# Patient Record
Sex: FEMALE | Race: WHITE | NOT HISPANIC OR LATINO | Employment: FULL TIME | URBAN - METROPOLITAN AREA
[De-identification: names, ages, dates, MRNs, and addresses within clinical notes are randomized per-mention and may not be internally consistent; named-entity substitution may affect disease eponyms.]

---

## 2017-03-07 ENCOUNTER — LAB VISIT (OUTPATIENT)
Dept: LAB | Facility: HOSPITAL | Age: 52
End: 2017-03-07
Attending: FAMILY MEDICINE
Payer: COMMERCIAL

## 2017-03-07 DIAGNOSIS — E08.8 DIABETES MELLITUS DUE TO UNDERLYING CONDITION WITH COMPLICATION, WITHOUT LONG-TERM CURRENT USE OF INSULIN: ICD-10-CM

## 2017-03-07 PROCEDURE — 83036 HEMOGLOBIN GLYCOSYLATED A1C: CPT

## 2017-03-07 PROCEDURE — 36415 COLL VENOUS BLD VENIPUNCTURE: CPT | Mod: PO

## 2017-03-08 LAB
ESTIMATED AVG GLUCOSE: 143 MG/DL
HBA1C MFR BLD HPLC: 6.6 %

## 2017-03-14 ENCOUNTER — OFFICE VISIT (OUTPATIENT)
Dept: FAMILY MEDICINE | Facility: CLINIC | Age: 52
End: 2017-03-14
Payer: COMMERCIAL

## 2017-03-14 VITALS
SYSTOLIC BLOOD PRESSURE: 114 MMHG | HEIGHT: 62 IN | BODY MASS INDEX: 40.33 KG/M2 | WEIGHT: 219.13 LBS | HEART RATE: 96 BPM | DIASTOLIC BLOOD PRESSURE: 70 MMHG | TEMPERATURE: 98 F

## 2017-03-14 DIAGNOSIS — R93.89 ABNORMAL CT SCAN, CHEST: ICD-10-CM

## 2017-03-14 DIAGNOSIS — E78.5 HYPERLIPIDEMIA, UNSPECIFIED HYPERLIPIDEMIA TYPE: ICD-10-CM

## 2017-03-14 DIAGNOSIS — E11.65 UNCONTROLLED TYPE 2 DIABETES MELLITUS WITH HYPERGLYCEMIA, WITHOUT LONG-TERM CURRENT USE OF INSULIN: ICD-10-CM

## 2017-03-14 DIAGNOSIS — I10 HTN (HYPERTENSION), BENIGN: Primary | ICD-10-CM

## 2017-03-14 LAB
CREAT UR-MCNC: 88 MG/DL
MICROALBUMIN UR DL<=1MG/L-MCNC: 11 UG/ML
MICROALBUMIN/CREATININE RATIO: 12.5 UG/MG

## 2017-03-14 PROCEDURE — 3078F DIAST BP <80 MM HG: CPT | Mod: S$GLB,,, | Performed by: FAMILY MEDICINE

## 2017-03-14 PROCEDURE — 1160F RVW MEDS BY RX/DR IN RCRD: CPT | Mod: S$GLB,,, | Performed by: FAMILY MEDICINE

## 2017-03-14 PROCEDURE — 3074F SYST BP LT 130 MM HG: CPT | Mod: S$GLB,,, | Performed by: FAMILY MEDICINE

## 2017-03-14 PROCEDURE — 99214 OFFICE O/P EST MOD 30 MIN: CPT | Mod: S$GLB,,, | Performed by: FAMILY MEDICINE

## 2017-03-14 PROCEDURE — 82570 ASSAY OF URINE CREATININE: CPT

## 2017-03-14 PROCEDURE — 3044F HG A1C LEVEL LT 7.0%: CPT | Mod: S$GLB,,, | Performed by: FAMILY MEDICINE

## 2017-03-14 PROCEDURE — 99999 PR PBB SHADOW E&M-EST. PATIENT-LVL IV: CPT | Mod: PBBFAC,,, | Performed by: FAMILY MEDICINE

## 2017-03-14 PROCEDURE — 4010F ACE/ARB THERAPY RXD/TAKEN: CPT | Mod: S$GLB,,, | Performed by: FAMILY MEDICINE

## 2017-03-14 PROCEDURE — 2022F DILAT RTA XM EVC RTNOPTHY: CPT | Mod: S$GLB,,, | Performed by: FAMILY MEDICINE

## 2017-03-14 RX ORDER — METFORMIN HYDROCHLORIDE 500 MG/1
1000 TABLET ORAL 2 TIMES DAILY WITH MEALS
Qty: 360 TABLET | Refills: 3 | Status: SHIPPED | OUTPATIENT
Start: 2017-03-14 | End: 2018-02-24 | Stop reason: SDUPTHER

## 2017-03-14 NOTE — MR AVS SNAPSHOT
[facility]  [address] [facility], [address]  [facility] [address]  Phone: [phone]  Fax: [phone]                  Yamileth Wong   3/14/2017 3:00 PM   Office Visit    Description:  Female : 1965   Provider:  Nicole Dickerson MD   Department:  [facility]           Reason for Visit     Diabetes     Arm Pain           Diagnoses this Visit        Comments    HTN (hypertension), benign    -  Primary     Uncontrolled type 2 diabetes mellitus with hyperglycemia, without long-term current use of insulin         Hyperlipidemia, unspecified hyperlipidemia type         Abnormal CT scan, chest                To Do List           Future Appointments        Provider Department Dept Phone    3/23/2017 10:00 AM Anand Garrison MD [facility] [phone]      Goals (5 Years of Data)     None      Follow-Up and Disposition     Return in about 3 months (around 2017) for hga1c only.       These Medications        Disp Refills Start End    metformin (GLUCOPHAGE) 500 MG tablet 360 tablet 3 3/14/2017 3/14/2018    Take 2 tablets (1,000 mg total) by mouth 2 (two) times daily with meals. - Oral    Pharmacy: [facility]/pharmacy #[id number] - [place] - [address] Ph #: [phone]         [facility] On Call     [facility] On Call Nurse Care Line -  Assistance  Registered nurses in the [facility] On Call Center provide clinical advisement, health education, appointment booking, and other advisory services.  Call for this free service at [phone].             Medications           Message regarding Medications     Verify the changes and/or additions to your medication regime listed below are the same as discussed with your clinician today.  If any of these changes or additions are incorrect, please notify your healthcare provider.        START taking these NEW medications        Refills    metformin (GLUCOPHAGE) 500 MG tablet 3    Sig: Take 2 tablets (1,000 mg  "total) by mouth 2 (two) times daily with meals.    Class: Normal    Route: Oral      STOP taking these medications     doxycycline (VIBRA-TABS) 100 MG tablet Take 1 tablet (100 mg total) by mouth every 12 (twelve) hours.           Verify that the below list of medications is an accurate representation of the medications you are currently taking.  If none reported, the list may be blank. If incorrect, please contact your healthcare provider. Carry this list with you in case of emergency.           Current Medications     antipyrine-benzocaine (AURALGAN OR EQUIV) 5.4-1.4 % Drop Place 3 drops into both ears 2 (two) times daily as needed.     atorvastatin (LIPITOR) 10 MG tablet Take 1 tablet (10 mg total) by mouth once daily.    blood sugar diagnostic (ACCU-CHEK FAITH PLUS TEST STRP) Strp TEST BLOOD SUGAR TWICE DAILY    blood-glucose meter kit Use as instructed    escitalopram oxalate (LEXAPRO) 10 MG tablet Take 1 tablet (10 mg total) by mouth once daily.    glipiZIDE (GLUCOTROL) 5 MG TR24 Take 1 tablet (5 mg total) by mouth daily with breakfast.    lancets (ACCU-CHEK SOFTCLIX LANCETS) Misc TEST BLOOD SUGAR TWICE DAILY    lisinopril-hydrochlorothiazide (PRINZIDE,ZESTORETIC) 10-12.5 mg per tablet Take 1 tablet by mouth once daily.    budesonide (RINOCORT AQUA) 32 mcg/actuation nasal spray 1 spray (32 mcg total) by Nasal route once daily.    fexofenadine (ALLEGRA) 180 MG tablet Take 1 tablet (180 mg total) by mouth once daily.    metformin (GLUCOPHAGE) 500 MG tablet Take 2 tablets (1,000 mg total) by mouth 2 (two) times daily with meals.           Clinical Reference Information           Your Vitals Were     BP Pulse Temp Height Weight BMI    114/70 (BP Location: Left arm) 96 98.3 °F (36.8 °C) 5' 2" (1.575 m) 99.4 kg (219 lb 2.2 oz) 40.08 kg/m2      Blood Pressure          Most Recent Value    BP  114/70      Allergies as of 3/14/2017     Latex    Penicillins      Immunizations Administered on Date of Encounter - " 3/14/2017     None      Orders Placed During Today's Visit      Normal Orders This Visit    Microalbumin/creatinine urine ratio     Future Labs/Procedures Expected by Expires    Hemoglobin A1c  6/14/2017 5/13/2018      Instructions    Get results of CT chest from [facility], may have upcoming thoracic surgery to remove & diagnosis  ================    FOLLOW UP REMINDER for DIABETICS:  ===================================  If you have DIABETES, we should evaluate your blood test every 3 MONTHS if your Hg1c is >6.5% OR if you use INSULIN.     ===================================  Continue other medications    The future risks of uncontrolled diabetes - include heart attack, stroke, neuropathy (pain in hands & feet that could lead to infection and amputation), nephropathy (leading to kidney dialysis) , and blindness     To prevent complications that can be treated early, please see your  opthalmologist EYE DOCTOR YEARLY      Always wear protective shoes.     Focus on the American Diabetic Association diet, high fiber, low fat diet, exercise  - huffing & puffing for 20 minutes most days of the week    Focus on NO SUGAR and no sugar substitutes (splenda, etc) IN YOUR DRINKS. With respect to food - LOW CARBOHYDRATES - switch to whole wheat & brown rice.    Decrease & try to stop ALCOHOL, WHITE BREAD, WHITE PASTA, WHITE RICE , WHITE POTATOES- which all turn into sugar.    EAT an EXTRA VEGETABLE A DAY than you already do.    The ADA recommends taking  1. Aspirin 81 mg daily (unless you have had bleeding stomach ulcers or allergy to this)  2. STATIN medication (atorvastatin, pravastatin, rosuvastatin) to decrease inflammation in your blood vessels caused by SUGAR to prevent future heart attack & stroke. This is recommended even if your LDL cholesterol is <100.   3. ACE/ ARB blood pressure medication (Losartan, Lisinopril) to protect your kidneys from future dialysis from SUGAR. This is recommended even if you have normal blood  pressure    Once YEARLY I will check basic labs CBC, CMP, fasting lipids, TSH, Hga1C prior to your visit      ----------------------------             Language Assistance Services     ATTENTION: Language assistance services are available, free of charge. Please call [phone].      ATENCIÓN: Si habla español, tiene a romo disposición servicios gratuitos de asistencia lingüística. Llame al [phone].     JENNIFER Ý: N?u b?n nói Ti?ng Vi?t, có các d?ch v? h? tr? ngôn ng? mi?n phí dành cho b?n. G?i s? [phone].         [facility] complies with applicable Federal civil rights laws and does not discriminate on the basis of race, color, national origin, age, disability, or sex.

## 2017-03-14 NOTE — PATIENT INSTRUCTIONS
Get results of CT chest from [facility], may have upcoming thoracic surgery to remove & diagnosis  ================    FOLLOW UP REMINDER for DIABETICS:  ===================================  If you have DIABETES, we should evaluate your blood test every 3 MONTHS if your Hg1c is >6.5% OR if you use INSULIN.     ===================================  Continue other medications    The future risks of uncontrolled diabetes - include heart attack, stroke, neuropathy (pain in hands & feet that could lead to infection and amputation), nephropathy (leading to kidney dialysis) , and blindness     To prevent complications that can be treated early, please see your  opthalmologist EYE DOCTOR YEARLY      Always wear protective shoes.     Focus on the American Diabetic Association diet, high fiber, low fat diet, exercise  - huffing & puffing for 20 minutes most days of the week    Focus on NO SUGAR and no sugar substitutes (splenda, etc) IN YOUR DRINKS. With respect to food - LOW CARBOHYDRATES - switch to whole wheat & brown rice.    Decrease & try to stop ALCOHOL, WHITE BREAD, WHITE PASTA, WHITE RICE , WHITE POTATOES- which all turn into sugar.    EAT an EXTRA VEGETABLE A DAY than you already do.    The ADA recommends taking  1. Aspirin 81 mg daily (unless you have had bleeding stomach ulcers or allergy to this)  2. STATIN medication (atorvastatin, pravastatin, rosuvastatin) to decrease inflammation in your blood vessels caused by SUGAR to prevent future heart attack & stroke. This is recommended even if your LDL cholesterol is <100.   3. ACE/ ARB blood pressure medication (Losartan, Lisinopril) to protect your kidneys from future dialysis from SUGAR. This is recommended even if you have normal blood pressure    Once YEARLY I will check basic labs CBC, CMP, fasting lipids, TSH, Hga1C prior to your visit      ----------------------------

## 2017-03-14 NOTE — MEDICAL/APP STUDENT
Subjective:       Patient ID: Yamileth Wong is a 51 y.o. female.    Chief Complaint: Diabetes and Arm Pain (left elbow)    HPI   Patient is a 51 year old female with a Hx of DM II, HTN, HLD. She came in for a follow-up of her Diabetes. She doesn't regularly record her sugar readings at home. However, she recollects sugar readings in the 150's. Patient admits she neither watches her diet nor exercises regularly.   She denies any blurry vision, numbness or tingling. No peripheral neuropathy. No polyuria, polydipsia or nocturia    Review of Systems    Constitutional: No fever, chills, night sweats, fatigue or weakness.  Respiratory: No coughs, no cold or wheezes  Cardiovascular: No SOB upon exertion, no palpitations, no chest pains.   GI: No diarrhea, no constipation.  : No increased urinary frequency or nocturia  Endocrine: No polyuria, polydipsia or nocturia.   Musculoskeletal: No joint pain, no muscle pain.  Psych: No anxiety    Objective:      Physical Exam    General: Patient is oriented to person place and time.  HEENT: No conjunctival pallor. No icterus. No cervical lymphadenopathy, no carotid bruits. Oropharynx moist and clear of exudate.  Respiratory: Bilateral air movement. No wheezes, no crackles.  Cardiovascular: Normal heart sounds. No murmurs, gallops or rubs.  GI: No tenderness,no guarding, no lumps. Normal bowel sounds.  Musculoskeletal: Normal ROM.  Skin: No ulcers or sores under both feet.  Neuro: No loss of sensation in the periphery    Assessment:       1. Uncontrolled type 2 diabetes mellitus with hyperglycemia, without long-term current use of insulin        Plan:       Patient is a 51 year old female here for a follow-up of her diabetes.   We discussed lifestyle modification to help control her sugar levels, but she seemed disinterested in pursuing any healthy lifestyle options.  We increased her metformin to a 100mg BID.   Patient is to follow up with labs in 3 weeks.

## 2017-03-14 NOTE — PROGRESS NOTES
Subjective:      Patient ID: Yamileth Wong is a 51 y.o. female.    Chief Complaint: Diabetes and Arm Pain (left elbow)    Here today for diabetes mellitus  6.6%    Denies acute symptoms such as nocturia, polyuria, unexplained weight loss or blurred vision.    Last eye evaluation due    Last urine microalbumin today    Denies numbness, tingling sensation, or known h/o peripheral neuropathy.     Denies  Chest pain, shortness of breath.    She has been eating more sugar as she is concerned about recent diagnosis of abnormal CAT scan of the chest.  As her ENT was evaluating for neck lymphadenopathy & hx thyroid removal, they found a 3 cm mass supple brittany.  With its location they are unable to do a needle biopsy so she will have to have thoracic surgery to remove for diagnosis.  This will be performed at [facility].  Unfortunately do not have the CAT scan results.denies  Weight loss, fever, chills, night sweats      Lab Results   Component Value Date    HGBA1C 6.6 (H) 03/07/2017    HGBA1C 6.5 (H) 11/10/2016    HGBA1C 7.4 (H) 08/02/2016     No results found for: MICROALBUR  Lab Results   Component Value Date    LDLCALC 74.0 11/10/2016    LDLCALC 92.6 09/17/2015    CHOL 147 11/10/2016    HDL 59 11/10/2016    TRIG 70 11/10/2016       Lab Results   Component Value Date     11/10/2016    K 5.1 11/10/2016     11/10/2016    CO2 25 11/10/2016     (H) 11/10/2016    BUN 15 11/10/2016    CREATININE 0.7 11/10/2016    CALCIUM 9.4 11/10/2016    PROT 7.1 11/10/2016    ALBUMIN 3.7 11/10/2016    BILITOT 0.4 11/10/2016    ALKPHOS 50 (L) 11/10/2016    AST 14 11/10/2016    ALT 18 11/10/2016    ANIONGAP 9 11/10/2016    ESTGFRAFRICA >60.0 11/10/2016    EGFRNONAA >60.0 11/10/2016    WBC 7.77 11/10/2016    HGB 12.5 11/10/2016    HCT 39.6 11/10/2016    MCV 88 11/10/2016     11/10/2016    TSH 1.548 11/10/2016         Current Outpatient Prescriptions on File Prior to Visit   Medication Sig     antipyrine-benzocaine (AURALGAN OR EQUIV) 5.4-1.4 % Drop Place 3 drops into both ears 2 (two) times daily as needed.     atorvastatin (LIPITOR) 10 MG tablet Take 1 tablet (10 mg total) by mouth once daily.    blood sugar diagnostic (ACCU-CHEK FAITH PLUS TEST STRP) Strp TEST BLOOD SUGAR TWICE DAILY    blood-glucose meter kit Use as instructed    escitalopram oxalate (LEXAPRO) 10 MG tablet Take 1 tablet (10 mg total) by mouth once daily.    glipiZIDE (GLUCOTROL) 5 MG TR24 Take 1 tablet (5 mg total) by mouth daily with breakfast.    lancets (ACCU-CHEK SOFTCLIX LANCETS) Misc TEST BLOOD SUGAR TWICE DAILY    lisinopril-hydrochlorothiazide (PRINZIDE,ZESTORETIC) 10-12.5 mg per tablet Take 1 tablet by mouth once daily.    [ metformin (GLUCOPHAGE) 850 MG tablet TAKE 1 TABLET (850 MG TOTAL) BY MOUTH 2 (TWO) TIMES DAILY WITH MEALS.    budesonide (RINOCORT AQUA) 32 mcg/actuation nasal spray 1 spray (32 mcg total) by Nasal route once daily.    fexofenadine (ALLEGRA) 180 MG tablet Take 1 tablet (180 mg total) by mouth once daily.     Past Medical History:   Diagnosis Date    Abnormal CT scan, chest 3/14/2017    At [facility], subcarinal 3 cm, upcoming surgery    Anxiety 10/9/2013    Lexapro works well    Colon polyp     2016, repeat before 2021    HLD (hyperlipidemia) 10/9/2013    LDL 62 Lip 10    HTN (hypertension), benign 10/9/2013    Type 2 diabetes mellitus, uncontrolled 10/9/2013    U+ ACE 2015, F-2015 Hga1c 6.6% metformin 850 bid. LDL 62 Lip 10    Uncontrolled type 2 diabetes mellitus with hyperglycemia, without long-term current use of insulin 3/14/2017     Past Surgical History:   Procedure Laterality Date    thyroid nodule  2009    DR Haynes     Social History     Social History Narrative    Works with insurance, No children, nonsmoker, ETOH rarely, GYN Bone, colon polyp 2016, repeat before 2021     No family history on file.  Vitals:    03/14/17 1456   BP: 114/70   Pulse: 96   Temp: 98.3 °F (36.8 °C)  "  Weight: 99.4 kg (219 lb 2.2 oz)   Height: 5' 2" (1.575 m)   PainSc:   5     Objective:   Physical Exam   Constitutional: She is oriented to person, place, and time. She appears well-developed and well-nourished.   HENT:   Head: Normocephalic and atraumatic.   Right Ear: External ear normal.   Left Ear: External ear normal.   Nose: Nose normal.   Mouth/Throat: Oropharynx is clear and moist.   Eyes: EOM are normal. Pupils are equal, round, and reactive to light.   Neck: Neck supple. No thyromegaly present.   Cardiovascular: Normal rate, regular rhythm, normal heart sounds and intact distal pulses.    No murmur heard.  Pulmonary/Chest: Effort normal and breath sounds normal. She has no wheezes.   Abdominal: Soft. Bowel sounds are normal. She exhibits no distension and no mass. There is no tenderness. There is no rebound and no guarding.   Musculoskeletal: She exhibits no edema.        Right foot: There is normal range of motion and no deformity.        Left foot: There is normal range of motion and no deformity.        Feet:   Right Foot:   Protective Sensation: 5 sites tested. 5 sites sensed.   Skin Integrity: Negative for ulcer, blister, skin breakdown, erythema or warmth.   Left Foot:   Protective Sensation: 5 sites tested. 5 sites sensed.   Skin Integrity: Negative for ulcer, blister, skin breakdown, erythema or warmth.   Lymphadenopathy:     She has no cervical adenopathy.   Neurological: She is alert and oriented to person, place, and time.   Skin: Skin is warm and dry.   Psychiatric: She has a normal mood and affect. Her behavior is normal.     Assessment:     1. HTN (hypertension), benign    2. Uncontrolled type 2 diabetes mellitus with hyperglycemia, without long-term current use of insulin    3. Hyperlipidemia, unspecified hyperlipidemia type    4. Abnormal CT scan, chest      Plan:     Orders Placed This Encounter    Hemoglobin A1c    Microalbumin/creatinine urine ratio    metformin (GLUCOPHAGE) 500 MG " tablet   2 po bid    BP stable, continue meds    Patient Instructions   Get results of CT chest from [facility], may have upcoming thoracic surgery to remove & diagnosis  ================    FOLLOW UP REMINDER for DIABETICS:  ===================================  If you have DIABETES, we should evaluate your blood test every 3 MONTHS if your Hg1c is >6.5% OR if you use INSULIN.     ===================================  Continue other medications    The future risks of uncontrolled diabetes - include heart attack, stroke, neuropathy (pain in hands & feet that could lead to infection and amputation), nephropathy (leading to kidney dialysis) , and blindness     To prevent complications that can be treated early, please see your  opthalmologist EYE DOCTOR YEARLY      Always wear protective shoes.     Focus on the American Diabetic Association diet, high fiber, low fat diet, exercise  - huffing & puffing for 20 minutes most days of the week    Focus on NO SUGAR and no sugar substitutes (splenda, etc) IN YOUR DRINKS. With respect to food - LOW CARBOHYDRATES - switch to whole wheat & brown rice.    Decrease & try to stop ALCOHOL, WHITE BREAD, WHITE PASTA, WHITE RICE , WHITE POTATOES- which all turn into sugar.    EAT an EXTRA VEGETABLE A DAY than you already do.    The ADA recommends taking  1. Aspirin 81 mg daily (unless you have had bleeding stomach ulcers or allergy to this)  2. STATIN medication (atorvastatin, pravastatin, rosuvastatin) to decrease inflammation in your blood vessels caused by SUGAR to prevent future heart attack & stroke. This is recommended even if your LDL cholesterol is <100.   3. ACE/ ARB blood pressure medication (Losartan, Lisinopril) to protect your kidneys from future dialysis from SUGAR. This is recommended even if you have normal blood pressure    Once YEARLY I will check basic labs CBC, CMP, fasting lipids, TSH, Hga1C prior to your  visit      ----------------------------

## 2017-03-23 ENCOUNTER — OFFICE VISIT (OUTPATIENT)
Dept: OBSTETRICS AND GYNECOLOGY | Facility: CLINIC | Age: 52
End: 2017-03-23
Payer: COMMERCIAL

## 2017-03-23 VITALS
SYSTOLIC BLOOD PRESSURE: 138 MMHG | HEIGHT: 62 IN | DIASTOLIC BLOOD PRESSURE: 80 MMHG | WEIGHT: 216.06 LBS | BODY MASS INDEX: 39.76 KG/M2

## 2017-03-23 DIAGNOSIS — Z01.419 ENCOUNTER FOR GYNECOLOGICAL EXAMINATION: Primary | ICD-10-CM

## 2017-03-23 DIAGNOSIS — J06.9 UPPER RESPIRATORY TRACT INFECTION, UNSPECIFIED TYPE: ICD-10-CM

## 2017-03-23 PROCEDURE — 99396 PREV VISIT EST AGE 40-64: CPT | Mod: S$GLB,,, | Performed by: OBSTETRICS & GYNECOLOGY

## 2017-03-23 PROCEDURE — 99999 PR PBB SHADOW E&M-EST. PATIENT-LVL III: CPT | Mod: PBBFAC,,, | Performed by: OBSTETRICS & GYNECOLOGY

## 2017-03-23 PROCEDURE — 3079F DIAST BP 80-89 MM HG: CPT | Mod: S$GLB,,, | Performed by: OBSTETRICS & GYNECOLOGY

## 2017-03-23 PROCEDURE — 3075F SYST BP GE 130 - 139MM HG: CPT | Mod: S$GLB,,, | Performed by: OBSTETRICS & GYNECOLOGY

## 2017-03-23 RX ORDER — AZITHROMYCIN 250 MG/1
500 TABLET, FILM COATED ORAL DAILY
Qty: 6 TABLET | Refills: 0 | Status: SHIPPED | OUTPATIENT
Start: 2017-03-23 | End: 2017-03-28

## 2017-03-23 NOTE — PROGRESS NOTES
CC: Well woman exam    Yamileth Wong is a 51 y.o. female  presents for a well woman exam.  She is established.LMP: No LMP recorded. Patient has had a hysterectomy..  Annual Exam, last mammo - normal DIS. Seeing a thoracic surgeon  for Neck lymph node. 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  Has seen Dr. Azevedo and Dr. Segal.  Is scheduled to go see Dr. Sears next month.  Patient without GYN complaints.    Last mammogram 2017 normal diagnostic imaging  Last colonoscopy  polyp benign repeat in 5 years Dr Morales    Health Maintenance   Topic Date Due    TETANUS VACCINE  1981    Pneumococcal PPSV23 (Medium Risk) (1) 1981    Colonoscopy  2013    Influenza Vaccine  2016    Hemoglobin A1c  2017    Eye Exam  2017    Lipid Panel  11/10/2017    Foot Exam  2018    Mammogram  2019    Pap Smear  2019    Hepatitis C Screening  Completed       Past Medical History:   Diagnosis Date    Abnormal CT scan, chest 2017    At [facility], subcarinal 3 cm, upcoming surgery    Abnormal Pap smear of cervix     Anxiety 10/9/2013    Lexapro works well    Colon polyp     , repeat before     HLD (hyperlipidemia) 10/9/2013    LDL 62 Lip 10    HTN (hypertension), benign 10/9/2013    Menopause     Type 2 diabetes mellitus, uncontrolled 10/9/2013    U+ ACE , F- Hga1c 6.6% metformin 850 bid. LDL 62 Lip 10    Uncontrolled type 2 diabetes mellitus with hyperglycemia, without long-term current use of insulin 3/14/2017     Past Surgical History:   Procedure Laterality Date    HYSTERECTOMY  2007    BARTOLOME/BSO for fibroids    neck needle biopsy  2017    thyroid nodule  2009    DR Haynes     Family History   Problem Relation Age of Onset    Heart attack Father     Cervical cancer Mother     Diabetes Maternal Grandmother     Colon cancer Neg Hx     Breast cancer Neg Hx      Social History  "  Substance Use Topics    Smoking status: Never Smoker    Smokeless tobacco: Never Used    Alcohol use Yes     OB History      Para Term  AB TAB SAB Ectopic Multiple Living    0 0 0 0 0 0 0 0 0 0          /80  Ht 5' 2" (1.575 m)  Wt 98 kg (216 lb 0.8 oz)  BMI 39.52 kg/m2    ROS:  GENERAL: Denies weight gain or weight loss. Feeling well overall.   SKIN: Denies rash or lesions.   HEAD: Denies head injury or headache.   NODES: Denies enlarged lymph nodes.   CHEST: Denies chest pain or shortness of breath.   CARDIOVASCULAR: Denies palpitations or left sided chest pain.   ABDOMEN: No abdominal pain, constipation, diarrhea, nausea, vomiting or rectal bleeding.   URINARY: No frequency, dysuria, hematuria, or burning on urination.  REPRODUCTIVE: See HPI.   BREASTS: The patient performs breast self-examination and denies pain, lumps, or nipple discharge.   HEMATOLOGIC: No easy bruisability or excessive bleeding.   MUSCULOSKELETAL: Denies joint pain or swelling.   NEUROLOGIC: Denies syncope or weakness.   PSYCHIATRIC: Denies depression, anxiety or mood swings.    PE:   APPEARANCE: Well nourished, well developed, in no acute distress.  AFFECT: WNL, alert and oriented x 3.  SKIN: No acne or hirsutism.  ABDOMEN: Soft. No tenderness or masses. No hepatosplenomegaly. No hernias.  BREASTS: Symmetrical, no skin changes or visible lesions. No palpable masses, nipple discharge bilaterally.  PELVIC: Normal external female genitalia without lesions. Normal hair distribution. Adequate perineal body, normal urethral meatus. Vagina atrophic without lesions or discharge. No significant cystocele or rectocele. Bimanual exam shows uterus and cervix to be surgically absent. Adnexa without masses or tenderness.  EXTREMITIES: No edema.      ICD-10-CM ICD-9-CM    1. Encounter for gynecological examination Z01.419 V72.31    2. Upper respiratory tract infection, unspecified type J06.9 465.9 azithromycin (ZITHROMAX Z-KRYSTAL) " 250 MG tablet           Patient was counseled today on A.C.S. Pap guidelines and recommendations for yearly pelvic exams, mammograms and monthly self breast exams; to see her PCP for other health maintenance.     Return in about 1 year (around 3/23/2018).

## 2017-03-23 NOTE — MR AVS SNAPSHOT
[facility]  [address]  [place] [address]  Phone: [phone]  Fax: [phone]                  Yamileth Wong   3/23/2017 10:00 AM   Office Visit    Description:  Female : 1965   Provider:  Anand Garrison MD   Department:  [facility]           Reason for Visit     Well Woman           Diagnoses this Visit        Comments    Encounter for gynecological examination    -  Primary     Upper respiratory tract infection, unspecified type                To Do List           Future Appointments        Provider Department Dept Phone    3/26/2018 10:00 AM Anand Garrison MD [facility] [phone]      Goals (5 Years of Data)     None      Follow-Up and Disposition     Return in about 1 year (around 3/23/2018).    Follow-up and Disposition History       These Medications        Disp Refills Start End    azithromycin (ZITHROMAX Z-KRYSTAL) 250 MG tablet 6 tablet 0 3/23/2017 3/28/2017    Take 2 tablets (500 mg total) by mouth once daily. 1 TABLET TWICE DAILY FOR THE FIRST DAY, THEN TAKE 1 TABLET DAILY FOR NEXT 4 DAYS - Oral    Pharmacy: [facility]/pharmacy #[id number] - [place] - [address] #: [phone]         [facility] On Call     [facility] On Call Nurse Care Line -  Assistance  Registered nurses in the [facility] provide clinical advisement, health education, appointment booking, and other advisory services.  Call for this free service at [phone].             Medications           Message regarding Medications     Verify the changes and/or additions to your medication regime listed below are the same as discussed with your clinician today.  If any of these changes or additions are incorrect, please notify your healthcare provider.        START taking these NEW medications        Refills    azithromycin (ZITHROMAX Z-KRYSTAL) 250 MG tablet 0    Sig: Take 2 tablets (500 mg total) by mouth once daily. 1 TABLET TWICE DAILY FOR THE  "FIRST DAY, THEN TAKE 1 TABLET DAILY FOR NEXT 4 DAYS    Class: Normal    Route: Oral           Verify that the below list of medications is an accurate representation of the medications you are currently taking.  If none reported, the list may be blank. If incorrect, please contact your healthcare provider. Carry this list with you in case of emergency.           Current Medications     antipyrine-benzocaine (AURALGAN OR EQUIV) 5.4-1.4 % Drop Place 3 drops into both ears 2 (two) times daily as needed.     atorvastatin (LIPITOR) 10 MG tablet Take 1 tablet (10 mg total) by mouth once daily.    azithromycin (ZITHROMAX Z-KRYSTAL) 250 MG tablet Take 2 tablets (500 mg total) by mouth once daily. 1 TABLET TWICE DAILY FOR THE FIRST DAY, THEN TAKE 1 TABLET DAILY FOR NEXT 4 DAYS    blood sugar diagnostic (ACCU-CHEK FAITH PLUS TEST STRP) Strp TEST BLOOD SUGAR TWICE DAILY    blood-glucose meter kit Use as instructed    budesonide (RINOCORT AQUA) 32 mcg/actuation nasal spray 1 spray (32 mcg total) by Nasal route once daily.    escitalopram oxalate (LEXAPRO) 10 MG tablet Take 1 tablet (10 mg total) by mouth once daily.    fexofenadine (ALLEGRA) 180 MG tablet Take 1 tablet (180 mg total) by mouth once daily.    glipiZIDE (GLUCOTROL) 5 MG TR24 Take 1 tablet (5 mg total) by mouth daily with breakfast.    lancets (ACCU-CHEK SOFTCLIX LANCETS) Misc TEST BLOOD SUGAR TWICE DAILY    lisinopril-hydrochlorothiazide (PRINZIDE,ZESTORETIC) 10-12.5 mg per tablet Take 1 tablet by mouth once daily.    metformin (GLUCOPHAGE) 500 MG tablet Take 2 tablets (1,000 mg total) by mouth 2 (two) times daily with meals.           Clinical Reference Information           Your Vitals Were     BP Height Weight BMI       138/80 5' 2" (1.575 m) 98 kg (216 lb 0.8 oz) 39.52 kg/m2       Blood Pressure          Most Recent Value    BP  138/80      Allergies as of 3/23/2017     Latex    Penicillins      Immunizations Administered on Date of Encounter - 3/23/2017     None "      Language Assistance Services     ATTENTION: Language assistance services are available, free of charge. Please call [phone].      ATENCIÓN: Si habla marco, tiene a romo disposición servicios gratuitos de asistencia lingüística. Llame al [phone].     CHÚ Ý: N?u b?n nói Ti?ng Vi?t, có các d?ch v? h? tr? ngôn ng? mi?n phí dành cho b?n. G?i s? [phone].         [facility] complies with applicable Federal civil rights laws and does not discriminate on the basis of race, color, national origin, age, disability, or sex.

## 2017-08-13 DIAGNOSIS — E08.8 DIABETES MELLITUS DUE TO UNDERLYING CONDITION WITH COMPLICATION, WITHOUT LONG-TERM CURRENT USE OF INSULIN: ICD-10-CM

## 2017-08-14 RX ORDER — GLIPIZIDE 5 MG/1
5 TABLET, FILM COATED, EXTENDED RELEASE ORAL
Qty: 90 TABLET | Refills: 0 | Status: SHIPPED | OUTPATIENT
Start: 2017-08-14 | End: 2017-11-11 | Stop reason: SDUPTHER

## 2017-08-21 ENCOUNTER — LAB VISIT (OUTPATIENT)
Dept: LAB | Facility: HOSPITAL | Age: 52
End: 2017-08-21
Attending: FAMILY MEDICINE
Payer: COMMERCIAL

## 2017-08-21 DIAGNOSIS — E11.65 UNCONTROLLED TYPE 2 DIABETES MELLITUS WITH HYPERGLYCEMIA, WITHOUT LONG-TERM CURRENT USE OF INSULIN: ICD-10-CM

## 2017-08-21 LAB
ESTIMATED AVG GLUCOSE: 131 MG/DL
HBA1C MFR BLD HPLC: 6.2 %

## 2017-08-21 PROCEDURE — 83036 HEMOGLOBIN GLYCOSYLATED A1C: CPT

## 2017-08-21 PROCEDURE — 36415 COLL VENOUS BLD VENIPUNCTURE: CPT | Mod: PO

## 2017-08-22 ENCOUNTER — PATIENT MESSAGE (OUTPATIENT)
Dept: FAMILY MEDICINE | Facility: CLINIC | Age: 52
End: 2017-08-22

## 2017-11-11 DIAGNOSIS — E08.8 DIABETES MELLITUS DUE TO UNDERLYING CONDITION WITH COMPLICATION, WITHOUT LONG-TERM CURRENT USE OF INSULIN: ICD-10-CM

## 2017-11-13 ENCOUNTER — PATIENT MESSAGE (OUTPATIENT)
Dept: FAMILY MEDICINE | Facility: CLINIC | Age: 52
End: 2017-11-13

## 2017-11-13 RX ORDER — GLIPIZIDE 5 MG/1
5 TABLET, FILM COATED, EXTENDED RELEASE ORAL
Qty: 90 TABLET | Refills: 0 | Status: SHIPPED | OUTPATIENT
Start: 2017-11-13 | End: 2018-02-20 | Stop reason: SDUPTHER

## 2017-11-13 NOTE — TELEPHONE ENCOUNTER
Due to see me for 3 month diabetic follow up. Hga1c order in , to have performed prior. I refilled med

## 2017-11-17 DIAGNOSIS — E11.9 TYPE 2 DIABETES MELLITUS WITHOUT COMPLICATION: ICD-10-CM

## 2017-12-03 ENCOUNTER — PATIENT MESSAGE (OUTPATIENT)
Dept: FAMILY MEDICINE | Facility: CLINIC | Age: 52
End: 2017-12-03

## 2017-12-08 ENCOUNTER — PATIENT MESSAGE (OUTPATIENT)
Dept: FAMILY MEDICINE | Facility: CLINIC | Age: 52
End: 2017-12-08

## 2017-12-08 RX ORDER — ESCITALOPRAM OXALATE 20 MG/1
20 TABLET ORAL DAILY
Qty: 90 TABLET | Refills: 3 | Status: SHIPPED | OUTPATIENT
Start: 2017-12-08 | End: 2018-12-01 | Stop reason: SDUPTHER

## 2017-12-11 ENCOUNTER — LAB VISIT (OUTPATIENT)
Dept: LAB | Facility: HOSPITAL | Age: 52
End: 2017-12-11
Attending: FAMILY MEDICINE
Payer: COMMERCIAL

## 2017-12-11 DIAGNOSIS — E11.9 TYPE 2 DIABETES MELLITUS WITHOUT COMPLICATION: ICD-10-CM

## 2017-12-11 LAB
CHOLEST SERPL-MCNC: 140 MG/DL
CHOLEST/HDLC SERPL: 2.5 {RATIO}
HDLC SERPL-MCNC: 55 MG/DL
HDLC SERPL: 39.3 %
LDLC SERPL CALC-MCNC: 64.6 MG/DL
NONHDLC SERPL-MCNC: 85 MG/DL
TRIGL SERPL-MCNC: 102 MG/DL

## 2017-12-11 PROCEDURE — 36415 COLL VENOUS BLD VENIPUNCTURE: CPT | Mod: PO

## 2017-12-11 PROCEDURE — 80061 LIPID PANEL: CPT

## 2017-12-19 ENCOUNTER — PATIENT MESSAGE (OUTPATIENT)
Dept: FAMILY MEDICINE | Facility: CLINIC | Age: 52
End: 2017-12-19

## 2017-12-21 ENCOUNTER — OFFICE VISIT (OUTPATIENT)
Dept: FAMILY MEDICINE | Facility: CLINIC | Age: 52
End: 2017-12-21
Payer: COMMERCIAL

## 2017-12-21 ENCOUNTER — HOSPITAL ENCOUNTER (OUTPATIENT)
Dept: RADIOLOGY | Facility: HOSPITAL | Age: 52
Discharge: HOME | End: 2017-12-21
Attending: NURSE PRACTITIONER
Payer: COMMERCIAL

## 2017-12-21 VITALS
TEMPERATURE: 99 F | WEIGHT: 217.81 LBS | HEIGHT: 62 IN | BODY MASS INDEX: 40.08 KG/M2 | SYSTOLIC BLOOD PRESSURE: 100 MMHG | HEART RATE: 68 BPM | OXYGEN SATURATION: 98 % | DIASTOLIC BLOOD PRESSURE: 70 MMHG

## 2017-12-21 DIAGNOSIS — J18.9 PNEUMONIA OF LEFT LOWER LOBE DUE TO INFECTIOUS ORGANISM: ICD-10-CM

## 2017-12-21 DIAGNOSIS — R06.2 WHEEZING: ICD-10-CM

## 2017-12-21 DIAGNOSIS — J18.9 PNEUMONIA OF LEFT LOWER LOBE DUE TO INFECTIOUS ORGANISM: Primary | ICD-10-CM

## 2017-12-21 PROCEDURE — 99214 OFFICE O/P EST MOD 30 MIN: CPT | Mod: S$GLB,,, | Performed by: NURSE PRACTITIONER

## 2017-12-21 PROCEDURE — 94760 N-INVAS EAR/PLS OXIMETRY 1: CPT | Mod: S$GLB,,, | Performed by: NURSE PRACTITIONER

## 2017-12-21 PROCEDURE — 71020 XR CHEST PA AND LATERAL: CPT | Mod: TC,PO

## 2017-12-21 PROCEDURE — 99999 PR PBB SHADOW E&M-EST. PATIENT-LVL V: CPT | Mod: PBBFAC,,, | Performed by: NURSE PRACTITIONER

## 2017-12-21 PROCEDURE — 71020 XR CHEST PA AND LATERAL: CPT | Mod: 26,,, | Performed by: RADIOLOGY

## 2017-12-21 RX ORDER — AZITHROMYCIN 250 MG/1
TABLET, FILM COATED ORAL
Qty: 6 TABLET | Refills: 0 | Status: SHIPPED | OUTPATIENT
Start: 2017-12-21 | End: 2018-03-08 | Stop reason: ALTCHOICE

## 2017-12-21 RX ORDER — ALBUTEROL SULFATE 90 UG/1
2 AEROSOL, METERED RESPIRATORY (INHALATION) EVERY 4 HOURS PRN
Qty: 1 INHALER | Refills: 11 | Status: SHIPPED | OUTPATIENT
Start: 2017-12-21 | End: 2022-02-16

## 2017-12-21 RX ORDER — PREDNISONE 20 MG/1
TABLET ORAL
Qty: 10 TABLET | Refills: 0 | Status: SHIPPED | OUTPATIENT
Start: 2017-12-21 | End: 2017-12-26

## 2017-12-21 NOTE — PROGRESS NOTES
Subjective:       Patient ID: Yamileth Wong is a 52 y.o. female.    Chief Complaint: Sinusitis and Chest Congestion    Pt here with cough and sinus congestion.  Pt states sx started last week with rhinorrhea, sinus congestion on Wednesday.  Pt states that she had a fever Wed, thur and Friday - did not measure but states that she had chills/ sweats.   Did not measure her temp.  Was taking Mucinex and felt better then yesterday states that she feels like she has been hit with a ton of bricks, fatigue, cough, chest congestion.  NO fever, no chills, cough productive.        Sinusitis   Associated symptoms include chills, congestion, coughing and sinus pressure. Pertinent negatives include no headaches, neck pain, sneezing or sore throat.     Past Medical History:   Diagnosis Date    Abnormal CT scan, chest 03/14/2017    At [facility], subcarinal 3 cm appears to be left lobe thyroid MRI July 2017 [facility], Dr Haynes    Abnormal Pap smear of cervix     Anxiety 10/9/2013    Lexapro works well    Colon polyp     2016, repeat before 2021    HLD (hyperlipidemia) 10/9/2013    LDL 62 Lip 10    HTN (hypertension), benign 10/9/2013    Menopause     Type 2 diabetes mellitus, uncontrolled 10/9/2013    U+ ACE 2015, F-2015 Hga1c 6.6% metformin 850 bid. LDL 62 Lip 10    Uncontrolled type 2 diabetes mellitus with hyperglycemia, without long-term current use of insulin 3/14/2017     Past Surgical History:   Procedure Laterality Date    HYSTERECTOMY  2007    BARTOLOME/BSO for fibroids    neck needle biopsy  2017    thyroid nodule  2009    DR Haynes     Social History     Social History Narrative    Works with insurance, No children, nonsmoker, ETOH rarely, GYN Bone, colon polyp 2016, repeat before 2021     Family History   Problem Relation Age of Onset    Heart attack Father     Cervical cancer Mother     Diabetes Maternal Grandmother     Colon cancer Neg Hx     Breast cancer Neg Hx      Vitals:    12/21/17 1604  "12/21/17 1640   BP: 100/70    Pulse: 68    Temp: 98.6 °F (37 °C)    SpO2:  98%   Weight: 98.8 kg (217 lb 13 oz)    Height: 5' 2" (1.575 m)    PainSc:   2      Outpatient Encounter Prescriptions as of 12/21/2017   Medication Sig Dispense Refill    antipyrine-benzocaine (AURALGAN OR EQUIV) 5.4-1.4 % Drop Place 3 drops into both ears 2 (two) times daily as needed.   0    atorvastatin (LIPITOR) 10 MG tablet Take 1 tablet (10 mg total) by mouth once daily. 30 tablet 12    blood sugar diagnostic (ACCU-CHEK FAITH PLUS TEST STRP) Strp TEST BLOOD SUGAR TWICE DAILY 200 strip 3    blood-glucose meter kit Use as instructed 1 each 0    budesonide (RINOCORT AQUA) 32 mcg/actuation nasal spray 1 spray (32 mcg total) by Nasal route once daily. 8.6 g 1    escitalopram oxalate (LEXAPRO) 20 MG tablet Take 1 tablet (20 mg total) by mouth once daily. 90 tablet 3    glipiZIDE (GLUCOTROL) 5 MG TR24 TAKE 1 TABLET (5 MG TOTAL) BY MOUTH DAILY WITH BREAKFAST. 90 tablet 0    lancets (ACCU-CHEK SOFTCLIX LANCETS) Misc TEST BLOOD SUGAR TWICE DAILY 200 each 3    lisinopril-hydrochlorothiazide (PRINZIDE,ZESTORETIC) 10-12.5 mg per tablet Take 1 tablet by mouth once daily. 30 tablet 12    metformin (GLUCOPHAGE) 500 MG tablet Take 2 tablets (1,000 mg total) by mouth 2 (two) times daily with meals. 360 tablet 3    albuterol 90 mcg/actuation inhaler Inhale 2 puffs into the lungs every 4 (four) hours as needed for Wheezing. Use with spacer 1 Inhaler 11    azithromycin (Z-KRYSTAL) 250 MG tablet Take 2 tablets today and then take 1 tablet once a day for 4 more days 6 tablet 0    fexofenadine (ALLEGRA) 180 MG tablet Take 1 tablet (180 mg total) by mouth once daily. 90 tablet 3    inhalation spacing device Use with inhaler dispense with mouthpiece 1 Device 1    predniSONE (DELTASONE) 20 MG tablet Take 2 po with breakfast x 5d 10 tablet 0     No facility-administered encounter medications on file as of 12/21/2017.      Wt Readings from Last 3 " "Encounters:   12/21/17 98.8 kg (217 lb 13 oz)   03/23/17 98 kg (216 lb 0.8 oz)   03/14/17 99.4 kg (219 lb 2.2 oz)     Last 3 sets of Vitals    Vitals - 1 value per visit 3/14/2017 3/23/2017 12/21/2017   SYSTOLIC 114 138 100   DIASTOLIC 70 80 70   PULSE 96 - 68   TEMPERATURE 98.3 - 98.6   SPO2 - - 98   Weight (lb) 219.14 216.05 217.81   Weight (kg) 99.4 98 98.8   HEIGHT 5' 2" 5' 2" 5' 2"   BODY MASS INDEX 40.08 39.52 39.84   VISIT REPORT - - -   Pain Score  5 0 2     No results found for: CBC  Review of Systems   Constitutional: Positive for chills and fatigue. Negative for fever and unexpected weight change.   HENT: Positive for congestion, postnasal drip, rhinorrhea and sinus pressure. Negative for drooling, sinus pain, sneezing, sore throat, tinnitus, trouble swallowing and voice change.    Respiratory: Positive for cough, chest tightness and wheezing.    Cardiovascular: Negative for chest pain.   Gastrointestinal: Negative for abdominal pain, diarrhea, nausea and vomiting.   Genitourinary: Negative for dysuria.   Musculoskeletal: Negative for arthralgias, myalgias, neck pain and neck stiffness.   Skin: Negative for rash.   Neurological: Negative for dizziness, light-headedness, numbness and headaches.   Hematological: Negative for adenopathy.   Psychiatric/Behavioral: Negative for sleep disturbance.       Objective:      Physical Exam   Constitutional: She is oriented to person, place, and time. She appears well-developed and well-nourished.   Appears ill     HENT:   Head: Normocephalic and atraumatic.   Right Ear: External ear normal.   Left Ear: External ear normal.   Nose: Rhinorrhea present.   Mouth/Throat: Uvula is midline, oropharynx is clear and moist and mucous membranes are normal. No tonsillar exudate.   Eyes: Conjunctivae are normal. Pupils are equal, round, and reactive to light. Right eye exhibits no discharge. Left eye exhibits no discharge.   Neck: Normal range of motion.   Cardiovascular: Normal " rate, regular rhythm and normal heart sounds.    Pulmonary/Chest: Effort normal. No stridor. No tachypnea and no bradypnea. She has decreased breath sounds in the left lower field. She has wheezes. She has rales in the left lower field.   Abdominal: Soft.   Musculoskeletal: Normal range of motion.   Lymphadenopathy:     She has no cervical adenopathy.   Neurological: She is alert and oriented to person, place, and time. No cranial nerve deficit.   Skin: Skin is warm and dry. Capillary refill takes less than 2 seconds. No rash noted. She is not diaphoretic. No erythema. No pallor.   Psychiatric: She has a normal mood and affect. Her behavior is normal. Judgment and thought content normal.   Nursing note and vitals reviewed.      Assessment:       1. Pneumonia of left lower lobe due to infectious organism        Plan:       ER precautions given  Yamileth was seen today for sinusitis and chest congestion.    Diagnoses and all orders for this visit:    Pneumonia of left lower lobe due to infectious organism  -     X-Ray Chest PA And Lateral; Future  -     predniSONE (DELTASONE) 20 MG tablet; Take 2 po with breakfast x 5d  -     azithromycin (Z-KRYSTAL) 250 MG tablet; Take 2 tablets today and then take 1 tablet once a day for 4 more days  -     inhalation spacing device; Use with inhaler dispense with mouthpiece  -     albuterol 90 mcg/actuation inhaler; Inhale 2 puffs into the lungs every 4 (four) hours as needed for Wheezing. Use with spacer      Patient Instructions   meds as directed    Chest xray today, if anything shows I will let you know

## 2018-01-01 RX ORDER — ATORVASTATIN CALCIUM 10 MG/1
TABLET, FILM COATED ORAL
Qty: 30 TABLET | Refills: 0
Start: 2018-01-01

## 2018-01-04 ENCOUNTER — PATIENT MESSAGE (OUTPATIENT)
Dept: FAMILY MEDICINE | Facility: CLINIC | Age: 53
End: 2018-01-04

## 2018-01-04 RX ORDER — ATORVASTATIN CALCIUM 10 MG/1
10 TABLET, FILM COATED ORAL DAILY
Qty: 90 TABLET | Refills: 3 | Status: SHIPPED | OUTPATIENT
Start: 2018-01-04 | End: 2018-12-09 | Stop reason: SDUPTHER

## 2018-01-04 RX ORDER — ATORVASTATIN CALCIUM 10 MG/1
10 TABLET, FILM COATED ORAL DAILY
Qty: 30 TABLET | Refills: 3 | Status: CANCELLED | OUTPATIENT
Start: 2018-01-04

## 2018-01-31 RX ORDER — LISINOPRIL AND HYDROCHLOROTHIAZIDE 10; 12.5 MG/1; MG/1
TABLET ORAL
Qty: 30 TABLET | Refills: 0 | OUTPATIENT
Start: 2018-01-31

## 2018-02-05 RX ORDER — LISINOPRIL AND HYDROCHLOROTHIAZIDE 10; 12.5 MG/1; MG/1
TABLET ORAL
Qty: 90 TABLET | Refills: 0 | Status: SHIPPED | OUTPATIENT
Start: 2018-02-05 | End: 2018-02-05 | Stop reason: SDUPTHER

## 2018-02-05 RX ORDER — LISINOPRIL AND HYDROCHLOROTHIAZIDE 10; 12.5 MG/1; MG/1
1 TABLET ORAL DAILY
Qty: 90 TABLET | Refills: 1 | Status: SHIPPED | OUTPATIENT
Start: 2018-02-05 | End: 2018-10-23 | Stop reason: SDUPTHER

## 2018-02-20 DIAGNOSIS — E08.8 DIABETES MELLITUS DUE TO UNDERLYING CONDITION WITH COMPLICATION, WITHOUT LONG-TERM CURRENT USE OF INSULIN: ICD-10-CM

## 2018-02-20 RX ORDER — GLIPIZIDE 5 MG/1
5 TABLET, FILM COATED, EXTENDED RELEASE ORAL
Qty: 90 TABLET | Refills: 0 | Status: SHIPPED | OUTPATIENT
Start: 2018-02-20 | End: 2018-05-14 | Stop reason: SDUPTHER

## 2018-02-24 DIAGNOSIS — E11.65 UNCONTROLLED TYPE 2 DIABETES MELLITUS WITH HYPERGLYCEMIA, WITHOUT LONG-TERM CURRENT USE OF INSULIN: ICD-10-CM

## 2018-02-26 RX ORDER — METFORMIN HYDROCHLORIDE 500 MG/1
TABLET ORAL
Qty: 360 TABLET | Refills: 0 | Status: SHIPPED | OUTPATIENT
Start: 2018-02-26 | End: 2018-05-21 | Stop reason: SDUPTHER

## 2018-03-02 ENCOUNTER — LAB VISIT (OUTPATIENT)
Dept: LAB | Facility: HOSPITAL | Age: 53
End: 2018-03-02
Attending: FAMILY MEDICINE
Payer: COMMERCIAL

## 2018-03-02 DIAGNOSIS — E08.8 DIABETES MELLITUS DUE TO UNDERLYING CONDITION WITH COMPLICATION, WITHOUT LONG-TERM CURRENT USE OF INSULIN: ICD-10-CM

## 2018-03-02 LAB
ESTIMATED AVG GLUCOSE: 137 MG/DL
HBA1C MFR BLD HPLC: 6.4 %

## 2018-03-02 PROCEDURE — 83036 HEMOGLOBIN GLYCOSYLATED A1C: CPT

## 2018-03-02 PROCEDURE — 36415 COLL VENOUS BLD VENIPUNCTURE: CPT | Mod: PO

## 2018-03-08 ENCOUNTER — OFFICE VISIT (OUTPATIENT)
Dept: FAMILY MEDICINE | Facility: CLINIC | Age: 53
End: 2018-03-08
Payer: COMMERCIAL

## 2018-03-08 VITALS
DIASTOLIC BLOOD PRESSURE: 73 MMHG | SYSTOLIC BLOOD PRESSURE: 116 MMHG | BODY MASS INDEX: 39.96 KG/M2 | WEIGHT: 217.13 LBS | TEMPERATURE: 99 F | HEART RATE: 96 BPM | HEIGHT: 62 IN

## 2018-03-08 DIAGNOSIS — F41.9 ANXIETY: ICD-10-CM

## 2018-03-08 DIAGNOSIS — I15.2 HYPERTENSION ASSOCIATED WITH DIABETES: ICD-10-CM

## 2018-03-08 DIAGNOSIS — E78.5 HYPERLIPIDEMIA ASSOCIATED WITH TYPE 2 DIABETES MELLITUS: ICD-10-CM

## 2018-03-08 DIAGNOSIS — E11.69 HYPERLIPIDEMIA ASSOCIATED WITH TYPE 2 DIABETES MELLITUS: ICD-10-CM

## 2018-03-08 DIAGNOSIS — E11.9 CONTROLLED TYPE 2 DIABETES MELLITUS WITHOUT COMPLICATION, WITHOUT LONG-TERM CURRENT USE OF INSULIN: Primary | ICD-10-CM

## 2018-03-08 DIAGNOSIS — Z28.39 IMMUNIZATION DEFICIENCY: ICD-10-CM

## 2018-03-08 DIAGNOSIS — E11.59 HYPERTENSION ASSOCIATED WITH DIABETES: ICD-10-CM

## 2018-03-08 DIAGNOSIS — E11.65 UNCONTROLLED TYPE 2 DIABETES MELLITUS WITH HYPERGLYCEMIA, WITHOUT LONG-TERM CURRENT USE OF INSULIN: ICD-10-CM

## 2018-03-08 LAB
CREAT UR-MCNC: 86 MG/DL
MICROALBUMIN UR DL<=1MG/L-MCNC: 7 UG/ML
MICROALBUMIN/CREATININE RATIO: 8.1 UG/MG

## 2018-03-08 PROCEDURE — 3074F SYST BP LT 130 MM HG: CPT | Mod: S$GLB,,, | Performed by: FAMILY MEDICINE

## 2018-03-08 PROCEDURE — 90715 TDAP VACCINE 7 YRS/> IM: CPT | Mod: S$GLB,,, | Performed by: FAMILY MEDICINE

## 2018-03-08 PROCEDURE — 90732 PPSV23 VACC 2 YRS+ SUBQ/IM: CPT | Mod: S$GLB,,, | Performed by: FAMILY MEDICINE

## 2018-03-08 PROCEDURE — 99999 PR PBB SHADOW E&M-EST. PATIENT-LVL III: CPT | Mod: PBBFAC,,, | Performed by: FAMILY MEDICINE

## 2018-03-08 PROCEDURE — 90471 IMMUNIZATION ADMIN: CPT | Mod: S$GLB,,, | Performed by: FAMILY MEDICINE

## 2018-03-08 PROCEDURE — 3078F DIAST BP <80 MM HG: CPT | Mod: S$GLB,,, | Performed by: FAMILY MEDICINE

## 2018-03-08 PROCEDURE — 99214 OFFICE O/P EST MOD 30 MIN: CPT | Mod: 25,S$GLB,, | Performed by: FAMILY MEDICINE

## 2018-03-08 PROCEDURE — 90472 IMMUNIZATION ADMIN EACH ADD: CPT | Mod: S$GLB,,, | Performed by: FAMILY MEDICINE

## 2018-03-08 PROCEDURE — 82043 UR ALBUMIN QUANTITATIVE: CPT

## 2018-03-08 NOTE — PATIENT INSTRUCTIONS
Due tetanus , pneumonia & flu vaccines    Continue other medications - BP stable    FOR  DIABETES:  ===================================    SEE ME EVERY 6 MONTHS if your Hga1c is < 6.9% (controlled)   GET BLOODWORK ONE WEEK PRIOR  ===================================    Your 1# medicine is  EXERCISE  - huffing & puffing for 20  MINUTES EVERY DAY - check your sugars & you'll see them go down    The future risks of uncontrolled diabetes - include heart attack, stroke, neuropathy (pain in hands & feet that could lead to infection and amputation), nephropathy (leading to kidney dialysis) , and blindness     To prevent complications that can be treated early, please see your  opthalmologist EYE DOCTOR YEARLY      Always wear protective SHOES    Focus on NO SUGAR and no sugar substitutes (splenda,s tevia, etc) IN YOUR DRINKS. With respect to food - LOW CARBOHYDRATES - switch to whole wheat & brown rice.    Decrease & try to stop ALCOHOL, WHITE BREAD, WHITE PASTA, WHITE RICE , WHITE POTATOES- which all turn into sugar.    EAT an EXTRA VEGETABLE A DAY than you already do.    The ADA recommends taking  1. Aspirin 81 mg daily (unless you have had bleeding stomach ulcers or allergy to this)  2. STATIN medication (atorvastatin, pravastatin, rosuvastatin) to decrease inflammation in your blood vessels caused by SUGAR to prevent future heart attack & stroke. This is recommended even if your LDL cholesterol is <100.   3. ARB blood pressure medication (Losartan) to protect your kidneys from future dialysis from SUGAR. This is recommended even if you have normal blood pressure    Consider reading the book -  End Of Diabetes by Dr Valadez    Once YEARLY I will check basic labs CBC, CMP, fasting lipids, TSH, Hga1C prior to your visit      ----------------------------

## 2018-03-08 NOTE — PROGRESS NOTES
Subjective:      Patient ID: Yamileth Wong is a 52 y.o. female.    Chief Complaint: Diabetes    Here today for diabetes mellitus - 6.4%    Denies acute symptoms such as nocturia, polyuria, unexplained weight loss or blurred vision.    Last eye evaluation due -overdue Dr greer    Last urine microalbumin today    Denies numbness, tingling sensation, or known h/o peripheral neuropathy.     Denies  Chest pain, shortness of breath.    The 10-year ASCVD risk score (Dixvern PEREZ Jr., et al., 2013) is: 2.6%    Values used to calculate the score:      Age: 52 years      Sex: Female      Is Non- : No      Diabetic: Yes      Tobacco smoker: No      Systolic Blood Pressure: 116 mmHg      Is BP treated: Yes      HDL Cholesterol: 55 mg/dL      Total Cholesterol: 140 mg/dL      Lab Results   Component Value Date    HGBA1C 6.4 (H) 03/02/2018    HGBA1C 6.2 (H) 08/21/2017    HGBA1C 6.6 (H) 03/07/2017     Lab Results   Component Value Date    MICALBCREAT 12.5 03/14/2017     Lab Results   Component Value Date    LDLCALC 64.6 12/11/2017    LDLCALC 74.0 11/10/2016    CHOL 140 12/11/2017    HDL 55 12/11/2017    TRIG 102 12/11/2017       Lab Results   Component Value Date     11/10/2016    K 5.1 11/10/2016     11/10/2016    CO2 25 11/10/2016     (H) 11/10/2016    BUN 15 11/10/2016    CREATININE 0.7 11/10/2016    CALCIUM 9.4 11/10/2016    PROT 7.1 11/10/2016    ALBUMIN 3.7 11/10/2016    BILITOT 0.4 11/10/2016    ALKPHOS 50 (L) 11/10/2016    AST 14 11/10/2016    ALT 18 11/10/2016    ANIONGAP 9 11/10/2016    ESTGFRAFRICA >60.0 11/10/2016    EGFRNONAA >60.0 11/10/2016    WBC 7.77 11/10/2016    HGB 12.5 11/10/2016    HCT 39.6 11/10/2016    MCV 88 11/10/2016     11/10/2016    TSH 1.548 11/10/2016         Current Outpatient Prescriptions on File Prior to Visit   Medication Sig    albuterol 90 mcg/actuation inhaler Inhale 2 puffs into the lungs every 4 (four) hours as needed for Wheezing. Use  with spacer    antipyrine-benzocaine (AURALGAN OR EQUIV) 5.4-1.4 % Drop Place 3 drops into both ears 2 (two) times daily as needed.     atorvastatin (LIPITOR) 10 MG tablet Take 1 tablet (10 mg total) by mouth once daily.    blood sugar diagnostic (ACCU-CHEK FAITH PLUS TEST STRP) Strp TEST BLOOD SUGAR TWICE DAILY    blood-glucose meter kit Use as instructed    budesonide (RINOCORT AQUA) 32 mcg/actuation nasal spray 1 spray (32 mcg total) by Nasal route once daily.    escitalopram oxalate (LEXAPRO) 20 MG tablet Take 1 tablet (20 mg total) by mouth once daily.    glipiZIDE (GLUCOTROL) 5 MG TR24 Take 1 tablet (5 mg total) by mouth daily with breakfast.    inhalation spacing device Use with inhaler dispense with mouthpiece    lancets (ACCU-CHEK SOFTCLIX LANCETS) Misc TEST BLOOD SUGAR TWICE DAILY    lisinopril-hydrochlorothiazide (PRINZIDE,ZESTORETIC) 10-12.5 mg per tablet Take 1 tablet by mouth once daily.    metFORMIN (GLUCOPHAGE) 500 MG tablet TAKE 2 TABLETS BY MOUTH TWICE DAILY WITH MEALS    fexofenadine (ALLEGRA) 180 MG tablet Take 1 tablet (180 mg total) by mouth once daily.     Past Medical History:   Diagnosis Date    Abnormal CT scan, chest 03/14/2017    At [facility], subcarinal 3 cm appears to be left lobe thyroid MRI July 2017 [facility], Dr Haynes    Abnormal Pap smear of cervix     Anxiety 10/9/2013    Lexapro works well    Colon polyp     2016, repeat before 2021    HLD (hyperlipidemia) 10/9/2013    LDL 62 Lip 10    HTN (hypertension), benign 10/9/2013    Hyperlipidemia associated with type 2 diabetes mellitus 10/9/2013    Hypertension associated with diabetes 10/9/2013    Menopause     Type 2 diabetes mellitus, uncontrolled 10/9/2013    U+ ACE 2015, F-2015 Hga1c 6.6% metformin 850 bid. LDL 62 Lip 10    Uncontrolled type 2 diabetes mellitus with hyperglycemia, without long-term current use of insulin 3/14/2017     Past Surgical History:   Procedure Laterality Date    HYSTERECTOMY   "2007    BARTOLOME/BSO for fibroids    neck needle biopsy  2017    thyroid nodule  2009    DR Haynes     Social History     Social History Narrative    Works with insurance, No children, nonsmoker, ETOH rarely, GYN Bone, colon polyp 2016, repeat before 2021     Family History   Problem Relation Age of Onset    Heart attack Father     Cervical cancer Mother     Diabetes Maternal Grandmother     Colon cancer Neg Hx     Breast cancer Neg Hx      Vitals:    03/08/18 1527   BP: 116/73   Pulse: 96   Temp: 98.5 °F (36.9 °C)   Weight: 98.5 kg (217 lb 2.5 oz)   Height: 5' 2" (1.575 m)   PainSc: 0-No pain     Objective:   Physical Exam   Constitutional: She is oriented to person, place, and time. She appears well-developed and well-nourished.   HENT:   Head: Normocephalic and atraumatic.   Right Ear: External ear normal.   Left Ear: External ear normal.   Nose: Nose normal.   Mouth/Throat: Oropharynx is clear and moist.   Eyes: EOM are normal. Pupils are equal, round, and reactive to light.   Neck: Neck supple. No thyromegaly present.   Cardiovascular: Normal rate, regular rhythm, normal heart sounds and intact distal pulses.    No murmur heard.  Pulmonary/Chest: Effort normal and breath sounds normal. She has no wheezes.   Abdominal: Soft. Bowel sounds are normal. She exhibits no distension and no mass. There is no tenderness. There is no rebound and no guarding.   Musculoskeletal: She exhibits no edema.        Right foot: There is normal range of motion and no deformity.        Left foot: There is normal range of motion and no deformity.        Feet:   Right Foot:   Protective Sensation: 5 sites tested. 5 sites sensed.   Skin Integrity: Negative for ulcer, blister, skin breakdown, erythema or warmth.   Left Foot:   Protective Sensation: 5 sites tested. 5 sites sensed.   Skin Integrity: Negative for ulcer, blister, skin breakdown, erythema or warmth.   Lymphadenopathy:     She has no cervical adenopathy.   Neurological: She " is alert and oriented to person, place, and time.   Skin: Skin is warm and dry.   Psychiatric: She has a normal mood and affect. Her behavior is normal.     Assessment:     1. Controlled type 2 diabetes mellitus without complication, without long-term current use of insulin    2. Anxiety    3. Hypertension associated with diabetes    4. Hyperlipidemia associated with type 2 diabetes mellitus      Plan:     Orders Placed This Encounter    Hemoglobin A1c       Patient Instructions   Due tetanus , pneumonia & flu vaccines    Continue other medications - BP stable    FOR  DIABETES:  ===================================    SEE ME EVERY 6 MONTHS if your Hga1c is < 6.9% (controlled)   GET BLOODWORK ONE WEEK PRIOR  ===================================    Your 1# medicine is  EXERCISE  - huffing & puffing for 20  MINUTES EVERY DAY - check your sugars & you'll see them go down    The future risks of uncontrolled diabetes - include heart attack, stroke, neuropathy (pain in hands & feet that could lead to infection and amputation), nephropathy (leading to kidney dialysis) , and blindness     To prevent complications that can be treated early, please see your  opthalmologist EYE DOCTOR YEARLY      Always wear protective SHOES    Focus on NO SUGAR and no sugar substitutes (splenda,s tevia, etc) IN YOUR DRINKS. With respect to food - LOW CARBOHYDRATES - switch to whole wheat & brown rice.    Decrease & try to stop ALCOHOL, WHITE BREAD, WHITE PASTA, WHITE RICE , WHITE POTATOES- which all turn into sugar.    EAT an EXTRA VEGETABLE A DAY than you already do.    The ADA recommends taking  1. Aspirin 81 mg daily (unless you have had bleeding stomach ulcers or allergy to this)  2. STATIN medication (atorvastatin, pravastatin, rosuvastatin) to decrease inflammation in your blood vessels caused by SUGAR to prevent future heart attack & stroke. This is recommended even if your LDL cholesterol is <100.   3. ARB blood pressure medication  (Losartan) to protect your kidneys from future dialysis from SUGAR. This is recommended even if you have normal blood pressure    Consider reading the book -  End Of Diabetes by Dr Valadez    Once YEARLY I will check basic labs CBC, CMP, fasting lipids, TSH, Hga1C prior to your visit      ----------------------------                                    Answers for HPI/ROS submitted by the patient on 3/7/2018   Diabetes problem  Diabetes type: type 2  MedicAlert ID: No  Disease duration: 15 years  blurred vision: No  chest pain: No  fatigue: No  foot paresthesias: No  foot ulcerations: No  polydipsia: No  polyphagia: No  polyuria: No  visual change: No  weakness: No  weight loss: No  Symptom course: stable  confusion: No  dizziness: No  headaches: No  hunger: No  mood changes: No  nervous/anxious: No  pallor: No  seizures: No  sleepiness: No  speech difficulty: No  sweats: No  tremors: No  blackouts: No  hospitalization: No  nocturnal hypoglycemia: No  required assistance: No  CVA: No  heart disease: No  impotence: No  nephropathy: No  peripheral neuropathy: No  PVD: No  retinopathy: No  autonomic neuropathy: No  CAD risks: family history, obesity  Current treatments: oral agent (dual therapy)  Treatment compliance: some of the time  Home blood tests: 1-2 x per week  Monitoring compliance: inadequate  Weight trend: stable  Current diet: generally healthy  Meal planning: avoidance of concentrated sweets  Exercise: intermittently  Dietitian visit: No  Eye exam current: No  Sees podiatrist: No

## 2018-03-08 NOTE — PROGRESS NOTES
Two patient identifiers used, allergies reviewed, vaccines confirmed.  Tdap vaccine administered IM left deltoid.  Pneumovax/23 pneumococcal polysaccharide vaccine administered IM right deltoid.  Pt tolerated both injefctions well, no redness or bruising at either injection site.  Pt advised to remain in clinic 15 mins following injections for observation.

## 2018-03-26 ENCOUNTER — OFFICE VISIT (OUTPATIENT)
Dept: OBSTETRICS AND GYNECOLOGY | Facility: CLINIC | Age: 53
End: 2018-03-26
Payer: COMMERCIAL

## 2018-03-26 VITALS
SYSTOLIC BLOOD PRESSURE: 120 MMHG | BODY MASS INDEX: 39.76 KG/M2 | DIASTOLIC BLOOD PRESSURE: 84 MMHG | WEIGHT: 216.06 LBS | HEIGHT: 62 IN

## 2018-03-26 DIAGNOSIS — J01.10 ACUTE NON-RECURRENT FRONTAL SINUSITIS: ICD-10-CM

## 2018-03-26 DIAGNOSIS — N95.1 MENOPAUSAL SYNDROME: ICD-10-CM

## 2018-03-26 DIAGNOSIS — Z01.419 ENCOUNTER FOR GYNECOLOGICAL EXAMINATION: Primary | ICD-10-CM

## 2018-03-26 PROCEDURE — 99999 PR PBB SHADOW E&M-EST. PATIENT-LVL III: CPT | Mod: PBBFAC,,, | Performed by: OBSTETRICS & GYNECOLOGY

## 2018-03-26 PROCEDURE — 99396 PREV VISIT EST AGE 40-64: CPT | Mod: S$GLB,,, | Performed by: OBSTETRICS & GYNECOLOGY

## 2018-03-26 RX ORDER — SULFAMETHOXAZOLE AND TRIMETHOPRIM 800; 160 MG/1; MG/1
1 TABLET ORAL 2 TIMES DAILY
Qty: 20 TABLET | Refills: 0 | Status: SHIPPED | OUTPATIENT
Start: 2018-03-26 | End: 2018-04-05

## 2018-03-26 NOTE — PROGRESS NOTES
CC: Well woman exam    Yamileth Wong is a 52 y.o. female  presents for a well woman exam.  She is established.  LMP: No LMP recorded. Patient has had a hysterectomy..     Annual Exam,   last mammo - birads 2 @ [facility],   last DEXA  at old office  tscore hip 0.13 and spine 1.59 normal  Last colonos  3 yrs ago  benign polyp        Health Maintenance   Topic Date Due    Eye Exam  2017    Hemoglobin A1c  2018    Lipid Panel  2018    Mammogram  2019    Foot Exam  2019    Colonoscopy  2026    TETANUS VACCINE  2028    Pneumococcal PPSV23 (Medium Risk) (2) 2028    Hepatitis C Screening  Completed    Influenza Vaccine  Addressed         Past Medical History:   Diagnosis Date    Abnormal CT scan, chest 2017    At [facility], subcarinal 3 cm appears to be left lobe thyroid MRI 2017 [facility], Dr Haynes    Abnormal Pap smear of cervix     Anxiety 10/9/2013    Lexapro works well    Colon polyp     , repeat before     HLD (hyperlipidemia) 10/9/2013    LDL 62 Lip 10    HTN (hypertension), benign 10/9/2013    Hyperlipidemia associated with type 2 diabetes mellitus 10/9/2013    Hypertension associated with diabetes 10/9/2013    Menopause     Type 2 diabetes mellitus, uncontrolled 10/9/2013    U+ ACE , F- Hga1c 6.6% metformin 850 bid. LDL 62 Lip 10    Uncontrolled type 2 diabetes mellitus with hyperglycemia, without long-term current use of insulin 3/14/2017       Past Surgical History:   Procedure Laterality Date    HYSTERECTOMY      BARTOLOME/BSO for fibroids    neck needle biopsy      thyroid nodule      DR Haynes       OB History    Para Term  AB Living   0 0 0 0 0 0   SAB TAB Ectopic Multiple Live Births   0 0 0 0               Family History   Problem Relation Age of Onset    Heart attack Father     Cervical cancer Mother     Diabetes Maternal Grandmother     Hyperlipidemia  "Brother     Obesity Brother     Colon cancer Neg Hx     Breast cancer Neg Hx        Social History   Substance Use Topics    Smoking status: Never Smoker    Smokeless tobacco: Never Used    Alcohol use No       /84   Ht 5' 2" (1.575 m)   Wt 98 kg (216 lb 0.8 oz)   BMI 39.52 kg/m²       ROS:  GENERAL: Denies weight gain or weight loss. Feeling well overall.   SKIN: Denies rash or lesions.   HEAD: Denies head injury or headache.   NODES: Denies enlarged lymph nodes.   CHEST: Denies chest pain or shortness of breath.   CARDIOVASCULAR: Denies palpitations or left sided chest pain.   ABDOMEN: No abdominal pain, constipation, diarrhea, nausea, vomiting or rectal bleeding.   URINARY: No frequency, dysuria, hematuria, or burning on urination.      Physical Exam:    APPEARANCE: Well nourished, well developed, in no acute distress.  AFFECT: WNL, alert and oriented x 3  SKIN: No acne or hirsutism  ABDOMEN: Soft.  No tenderness or masses.  No hepatosplenomegaly.  No hernias.  BREASTS: Symmetrical, no skin changes or visible lesions.  No palpable masses, nipple discharge bilaterally.  PELVIC: Normal external genitalia without lesions.  Normal hair distribution.  Adequate perineal body, normal urethral meatus.  Vagina moist and well rugated without lesions or discharge.  Cervix pink, without lesions, discharge or tenderness.  No significant cystocele or rectocele.  Bimanual exam shows uterus to be normal size, regular, mobile and nontender.  Adnexa without masses or tenderness.    EXTREMITIES: No edema.    ASSESSMENT AND PLAN  1. Encounter for gynecological examination     2. Menopausal syndrome  DXA Bone Density Spine And Hip   3. Acute non-recurrent frontal sinusitis  sulfamethoxazole-trimethoprim 800-160mg (BACTRIM DS) 800-160 mg Tab       Patient was counseled today on A.C.S. Pap guidelines and recommendations for yearly pelvic exams, mammograms and monthly self breast exams; to see her PCP for other health " maintenance.     Follow-up in about 1 year (around 3/26/2019), or for BMD and NP appt.

## 2018-05-03 ENCOUNTER — PATIENT MESSAGE (OUTPATIENT)
Dept: OBSTETRICS AND GYNECOLOGY | Facility: CLINIC | Age: 53
End: 2018-05-03

## 2018-05-04 NOTE — TELEPHONE ENCOUNTER
I just checked my folder and in media and have not received her Bone Density.  Can you call [facility] on Monday and get them to fax it over?

## 2018-05-14 DIAGNOSIS — E08.8 DIABETES MELLITUS DUE TO UNDERLYING CONDITION WITH COMPLICATION, WITHOUT LONG-TERM CURRENT USE OF INSULIN: ICD-10-CM

## 2018-05-15 RX ORDER — GLIPIZIDE 5 MG/1
TABLET, FILM COATED, EXTENDED RELEASE ORAL
Qty: 90 TABLET | Refills: 0 | Status: SHIPPED | OUTPATIENT
Start: 2018-05-15 | End: 2018-08-08 | Stop reason: SDUPTHER

## 2018-05-21 DIAGNOSIS — E11.65 UNCONTROLLED TYPE 2 DIABETES MELLITUS WITH HYPERGLYCEMIA, WITHOUT LONG-TERM CURRENT USE OF INSULIN: ICD-10-CM

## 2018-05-22 RX ORDER — METFORMIN HYDROCHLORIDE 500 MG/1
TABLET ORAL
Qty: 360 TABLET | Refills: 0 | Status: SHIPPED | OUTPATIENT
Start: 2018-05-22 | End: 2018-08-15 | Stop reason: SDUPTHER

## 2018-08-08 DIAGNOSIS — E08.8 DIABETES MELLITUS DUE TO UNDERLYING CONDITION WITH COMPLICATION, WITHOUT LONG-TERM CURRENT USE OF INSULIN: ICD-10-CM

## 2018-08-09 RX ORDER — GLIPIZIDE 5 MG/1
TABLET, FILM COATED, EXTENDED RELEASE ORAL
Qty: 90 TABLET | Refills: 1 | Status: SHIPPED | OUTPATIENT
Start: 2018-08-09 | End: 2018-09-07

## 2018-08-15 DIAGNOSIS — E11.65 UNCONTROLLED TYPE 2 DIABETES MELLITUS WITH HYPERGLYCEMIA, WITHOUT LONG-TERM CURRENT USE OF INSULIN: ICD-10-CM

## 2018-08-16 RX ORDER — METFORMIN HYDROCHLORIDE 500 MG/1
TABLET ORAL
Qty: 360 TABLET | Refills: 0 | Status: SHIPPED | OUTPATIENT
Start: 2018-08-16 | End: 2018-11-13 | Stop reason: SDUPTHER

## 2018-08-17 ENCOUNTER — PATIENT MESSAGE (OUTPATIENT)
Dept: FAMILY MEDICINE | Facility: CLINIC | Age: 53
End: 2018-08-17

## 2018-08-29 ENCOUNTER — PATIENT MESSAGE (OUTPATIENT)
Dept: FAMILY MEDICINE | Facility: CLINIC | Age: 53
End: 2018-08-29

## 2018-08-29 ENCOUNTER — LAB VISIT (OUTPATIENT)
Dept: LAB | Facility: HOSPITAL | Age: 53
End: 2018-08-29
Attending: FAMILY MEDICINE
Payer: COMMERCIAL

## 2018-08-29 DIAGNOSIS — E11.9 CONTROLLED TYPE 2 DIABETES MELLITUS WITHOUT COMPLICATION, WITHOUT LONG-TERM CURRENT USE OF INSULIN: ICD-10-CM

## 2018-08-29 LAB
ESTIMATED AVG GLUCOSE: 143 MG/DL
HBA1C MFR BLD HPLC: 6.6 %

## 2018-08-29 PROCEDURE — 36415 COLL VENOUS BLD VENIPUNCTURE: CPT | Mod: PO

## 2018-08-29 PROCEDURE — 83036 HEMOGLOBIN GLYCOSYLATED A1C: CPT

## 2018-08-30 ENCOUNTER — PATIENT MESSAGE (OUTPATIENT)
Dept: FAMILY MEDICINE | Facility: CLINIC | Age: 53
End: 2018-08-30

## 2018-09-07 DIAGNOSIS — E08.8 DIABETES MELLITUS DUE TO UNDERLYING CONDITION WITH COMPLICATION, WITHOUT LONG-TERM CURRENT USE OF INSULIN: ICD-10-CM

## 2018-09-07 RX ORDER — GLIPIZIDE 10 MG/1
10 TABLET, FILM COATED, EXTENDED RELEASE ORAL
Qty: 90 TABLET | Refills: 0
Start: 2018-09-07 | End: 2019-01-12 | Stop reason: SDUPTHER

## 2018-10-05 ENCOUNTER — PATIENT MESSAGE (OUTPATIENT)
Dept: FAMILY MEDICINE | Facility: CLINIC | Age: 53
End: 2018-10-05

## 2018-10-24 RX ORDER — LISINOPRIL AND HYDROCHLOROTHIAZIDE 10; 12.5 MG/1; MG/1
TABLET ORAL
Qty: 90 TABLET | Refills: 3 | Status: SHIPPED | OUTPATIENT
Start: 2018-10-24 | End: 2019-10-10 | Stop reason: SDUPTHER

## 2018-11-13 DIAGNOSIS — E11.65 UNCONTROLLED TYPE 2 DIABETES MELLITUS WITH HYPERGLYCEMIA, WITHOUT LONG-TERM CURRENT USE OF INSULIN: ICD-10-CM

## 2018-11-13 RX ORDER — METFORMIN HYDROCHLORIDE 500 MG/1
TABLET ORAL
Qty: 360 TABLET | Refills: 0 | Status: SHIPPED | OUTPATIENT
Start: 2018-11-13 | End: 2019-02-13 | Stop reason: SDUPTHER

## 2018-12-03 RX ORDER — ESCITALOPRAM OXALATE 20 MG/1
TABLET ORAL
Qty: 90 TABLET | Refills: 3 | Status: SHIPPED | OUTPATIENT
Start: 2018-12-03 | End: 2019-11-14 | Stop reason: SDUPTHER

## 2018-12-10 RX ORDER — ATORVASTATIN CALCIUM 10 MG/1
TABLET, FILM COATED ORAL
Qty: 90 TABLET | Refills: 3 | Status: SHIPPED | OUTPATIENT
Start: 2018-12-10 | End: 2019-11-25 | Stop reason: SDUPTHER

## 2018-12-21 DIAGNOSIS — E11.9 TYPE 2 DIABETES MELLITUS WITHOUT COMPLICATION: ICD-10-CM

## 2019-01-14 RX ORDER — GLIPIZIDE 10 MG/1
TABLET, FILM COATED, EXTENDED RELEASE ORAL
Qty: 90 TABLET | Refills: 0 | Status: SHIPPED | OUTPATIENT
Start: 2019-01-14 | End: 2019-04-10 | Stop reason: SDUPTHER

## 2019-01-15 ENCOUNTER — PATIENT MESSAGE (OUTPATIENT)
Dept: FAMILY MEDICINE | Facility: CLINIC | Age: 54
End: 2019-01-15

## 2019-01-15 DIAGNOSIS — E11.9 CONTROLLED TYPE 2 DIABETES MELLITUS WITHOUT COMPLICATION, WITHOUT LONG-TERM CURRENT USE OF INSULIN: Primary | ICD-10-CM

## 2019-01-30 ENCOUNTER — PATIENT MESSAGE (OUTPATIENT)
Dept: FAMILY MEDICINE | Facility: CLINIC | Age: 54
End: 2019-01-30

## 2019-01-30 DIAGNOSIS — M79.675 TOE PAIN, LEFT: Primary | ICD-10-CM

## 2019-01-31 ENCOUNTER — LAB VISIT (OUTPATIENT)
Dept: LAB | Facility: HOSPITAL | Age: 54
End: 2019-01-31
Attending: FAMILY MEDICINE
Payer: COMMERCIAL

## 2019-01-31 DIAGNOSIS — E11.9 TYPE 2 DIABETES MELLITUS WITHOUT COMPLICATION: ICD-10-CM

## 2019-01-31 DIAGNOSIS — E11.9 CONTROLLED TYPE 2 DIABETES MELLITUS WITHOUT COMPLICATION, WITHOUT LONG-TERM CURRENT USE OF INSULIN: ICD-10-CM

## 2019-01-31 LAB
CHOLEST SERPL-MCNC: 127 MG/DL
CHOLEST/HDLC SERPL: 2.6 {RATIO}
ESTIMATED AVG GLUCOSE: 146 MG/DL
HBA1C MFR BLD HPLC: 6.7 %
HDLC SERPL-MCNC: 48 MG/DL
HDLC SERPL: 37.8 %
LDLC SERPL CALC-MCNC: 61.6 MG/DL
NONHDLC SERPL-MCNC: 79 MG/DL
TRIGL SERPL-MCNC: 87 MG/DL

## 2019-01-31 PROCEDURE — 83036 HEMOGLOBIN GLYCOSYLATED A1C: CPT

## 2019-01-31 PROCEDURE — 36415 COLL VENOUS BLD VENIPUNCTURE: CPT | Mod: PO

## 2019-01-31 PROCEDURE — 80061 LIPID PANEL: CPT

## 2019-01-31 NOTE — TELEPHONE ENCOUNTER
External referral faxed to Dr. Mora.  Fax confirm rec'd.  No authorization needed.  Pt advised via voice mail msg.

## 2019-02-01 ENCOUNTER — TELEPHONE (OUTPATIENT)
Dept: FAMILY MEDICINE | Facility: CLINIC | Age: 54
End: 2019-02-01

## 2019-02-01 NOTE — TELEPHONE ENCOUNTER
Please let pt know that Hga1c has increased to 6.7% from 6.4%. Please offer appt with me to discuss

## 2019-02-12 ENCOUNTER — OFFICE VISIT (OUTPATIENT)
Dept: FAMILY MEDICINE | Facility: CLINIC | Age: 54
End: 2019-02-12
Payer: COMMERCIAL

## 2019-02-12 VITALS
HEART RATE: 79 BPM | WEIGHT: 219.38 LBS | SYSTOLIC BLOOD PRESSURE: 118 MMHG | HEIGHT: 62 IN | TEMPERATURE: 98 F | BODY MASS INDEX: 40.37 KG/M2 | DIASTOLIC BLOOD PRESSURE: 64 MMHG

## 2019-02-12 DIAGNOSIS — E11.9 CONTROLLED TYPE 2 DIABETES MELLITUS WITHOUT COMPLICATION, WITHOUT LONG-TERM CURRENT USE OF INSULIN: Primary | ICD-10-CM

## 2019-02-12 DIAGNOSIS — E78.5 HYPERLIPIDEMIA ASSOCIATED WITH TYPE 2 DIABETES MELLITUS: ICD-10-CM

## 2019-02-12 DIAGNOSIS — E11.65 UNCONTROLLED TYPE 2 DIABETES MELLITUS WITH HYPERGLYCEMIA, WITHOUT LONG-TERM CURRENT USE OF INSULIN: ICD-10-CM

## 2019-02-12 DIAGNOSIS — E11.59 HYPERTENSION ASSOCIATED WITH DIABETES: ICD-10-CM

## 2019-02-12 DIAGNOSIS — I15.2 HYPERTENSION ASSOCIATED WITH DIABETES: ICD-10-CM

## 2019-02-12 DIAGNOSIS — E11.69 HYPERLIPIDEMIA ASSOCIATED WITH TYPE 2 DIABETES MELLITUS: ICD-10-CM

## 2019-02-12 DIAGNOSIS — F41.9 ANXIETY: ICD-10-CM

## 2019-02-12 PROCEDURE — 99214 PR OFFICE/OUTPT VISIT, EST, LEVL IV, 30-39 MIN: ICD-10-PCS | Mod: S$GLB,,, | Performed by: FAMILY MEDICINE

## 2019-02-12 PROCEDURE — 3074F PR MOST RECENT SYSTOLIC BLOOD PRESSURE < 130 MM HG: ICD-10-PCS | Mod: CPTII,S$GLB,, | Performed by: FAMILY MEDICINE

## 2019-02-12 PROCEDURE — 99214 OFFICE O/P EST MOD 30 MIN: CPT | Mod: S$GLB,,, | Performed by: FAMILY MEDICINE

## 2019-02-12 PROCEDURE — 3078F PR MOST RECENT DIASTOLIC BLOOD PRESSURE < 80 MM HG: ICD-10-PCS | Mod: CPTII,S$GLB,, | Performed by: FAMILY MEDICINE

## 2019-02-12 PROCEDURE — 3044F HG A1C LEVEL LT 7.0%: CPT | Mod: CPTII,S$GLB,, | Performed by: FAMILY MEDICINE

## 2019-02-12 PROCEDURE — 3078F DIAST BP <80 MM HG: CPT | Mod: CPTII,S$GLB,, | Performed by: FAMILY MEDICINE

## 2019-02-12 PROCEDURE — 99999 PR PBB SHADOW E&M-EST. PATIENT-LVL III: ICD-10-PCS | Mod: PBBFAC,,, | Performed by: FAMILY MEDICINE

## 2019-02-12 PROCEDURE — 3044F PR MOST RECENT HEMOGLOBIN A1C LEVEL <7.0%: ICD-10-PCS | Mod: CPTII,S$GLB,, | Performed by: FAMILY MEDICINE

## 2019-02-12 PROCEDURE — 3074F SYST BP LT 130 MM HG: CPT | Mod: CPTII,S$GLB,, | Performed by: FAMILY MEDICINE

## 2019-02-12 PROCEDURE — 3008F PR BODY MASS INDEX (BMI) DOCUMENTED: ICD-10-PCS | Mod: CPTII,S$GLB,, | Performed by: FAMILY MEDICINE

## 2019-02-12 PROCEDURE — 99999 PR PBB SHADOW E&M-EST. PATIENT-LVL III: CPT | Mod: PBBFAC,,, | Performed by: FAMILY MEDICINE

## 2019-02-12 PROCEDURE — 3008F BODY MASS INDEX DOCD: CPT | Mod: CPTII,S$GLB,, | Performed by: FAMILY MEDICINE

## 2019-02-12 PROCEDURE — 82043 UR ALBUMIN QUANTITATIVE: CPT

## 2019-02-12 NOTE — PATIENT INSTRUCTIONS
Continue medications    FOR  DIABETES:  ==================================  SEE ME 3 MONTHS with  BLOODWORK one week PRIOR  ===================================    Your 1# medicine is  EXERCISE  - huffing & puffing for 20  MINUTES EVERY DAY - check your sugars & you'll see them go down    The future risks of uncontrolled diabetes - include heart attack, stroke, neuropathy (pain in hands & feet that could lead to infection and amputation), nephropathy (leading to kidney dialysis) , and blindness     To prevent complications that can be treated early, please see your  opthalmologist EYE DOCTOR YEARLY      Always wear protective SHOES    Focus on NO SUGAR and no sugar substitutes (splenda,s tevia, etc) IN YOUR DRINKS. With respect to food - LOW CARBOHYDRATES - switch to whole wheat & brown rice.    Decrease & try to stop ALCOHOL, WHITE BREAD, WHITE PASTA, WHITE RICE , WHITE POTATOES- which all turn into sugar.    EAT an EXTRA VEGETABLE A DAY than you already do.    The ADA recommends taking  1. Aspirin 81 mg daily (unless you have had bleeding stomach ulcers or allergy to this)  2. STATIN medication (atorvastatin, pravastatin, rosuvastatin) to decrease inflammation in your blood vessels caused by SUGAR to prevent future heart attack & stroke. This is recommended even if your LDL cholesterol is <100.   3. ARB blood pressure medication (Losartan) to protect your kidneys from future dialysis from SUGAR. This is recommended even if you have normal blood pressure    Consider reading the book -  End Of Diabetes by Dr Valadez    Once YEARLY I will check basic labs CBC, CMP, fasting lipids, TSH, Hga1C prior to your visit      ----------------------------

## 2019-02-12 NOTE — PROGRESS NOTES
Subjective:      Patient ID: Yamileth Wong is a 53 y.o. female.    Chief Complaint: Diabetes (follow up)    Here today for DM increasing.  She admits to enjoying being in [place], not walking as much since she injured her left foot    She currently is taking metformin 1000 mg twice a day a along with glipizide 5.  She feels that glipizide 10 worked great in the past without side effects, she would like to go back to this.  She adamantly does not want to add another medication at this time will focus on exercising more.  She requests to recheck this in 3 months    Denies acute symptoms such as nocturia, polyuria, unexplained weight loss or blurred vision.    Eye evaluation yearly    Urine microalbumin today    Denies numbness, tingling sensation, or known h/o peripheral neuropathy.     Denies  Chest pain, shortness of breath.      Lab Results   Component Value Date    HGBA1C 6.7 (H) 01/31/2019    HGBA1C 6.6 (H) 08/29/2018    HGBA1C 6.4 (H) 03/02/2018     Lab Results   Component Value Date    MICALBCREAT 8.1 03/08/2018     Lab Results   Component Value Date    LDLCALC 61.6 (L) 01/31/2019    LDLCALC 64.6 12/11/2017    CHOL 127 01/31/2019    HDL 48 01/31/2019    TRIG 87 01/31/2019       Lab Results   Component Value Date     11/10/2016    K 5.1 11/10/2016     11/10/2016    CO2 25 11/10/2016     (H) 11/10/2016    BUN 15 11/10/2016    CREATININE 0.7 11/10/2016    CALCIUM 9.4 11/10/2016    PROT 7.1 11/10/2016    ALBUMIN 3.7 11/10/2016    BILITOT 0.4 11/10/2016    ALKPHOS 50 (L) 11/10/2016    AST 14 11/10/2016    ALT 18 11/10/2016    ANIONGAP 9 11/10/2016    ESTGFRAFRICA >60.0 11/10/2016    EGFRNONAA >60.0 11/10/2016    WBC 7.77 11/10/2016    HGB 12.5 11/10/2016    HGB 13.0 12/09/2014    HCT 39.6 11/10/2016    MCV 88 11/10/2016     11/10/2016    TSH 1.548 11/10/2016    HEPCAB Negative 08/02/2016         Current Outpatient Medications on File Prior to Visit   Medication Sig    atorvastatin  (LIPITOR) 10 MG tablet TAKE 1 TABLET BY MOUTH ONCE DAILY    blood sugar diagnostic (ACCU-CHEK FAITH PLUS TEST STRP) Strp TEST BLOOD SUGAR TWICE DAILY    blood-glucose meter kit Use as instructed    escitalopram oxalate (LEXAPRO) 20 MG tablet TAKE 1 TABLET BY MOUTH DAILY    glipiZIDE (GLUCOTROL) 10 MG TR24 TAKE 1 TABLET BY MOUTH EVERY DAY IN THE MORNING WITH BREAKFAST    lancets (ACCU-CHEK SOFTCLIX LANCETS) Misc TEST BLOOD SUGAR TWICE DAILY    lisinopril-hydrochlorothiazide (PRINZIDE,ZESTORETIC) 10-12.5 mg per tablet TAKE 1 TABLET BY MOUTH EVERY DAY    metFORMIN (GLUCOPHAGE) 500 MG tablet TAKE 2 TABLETS BY MOUTH TWICE DAILY WITH MEALS    albuterol 90 mcg/actuation inhaler Inhale 2 puffs into the lungs every 4 (four) hours as needed for Wheezing. Use with spacer    antipyrine-benzocaine (AURALGAN OR EQUIV) 5.4-1.4 % Drop Place 3 drops into both ears 2 (two) times daily as needed.      No current facility-administered medications on file prior to visit.      Past Medical History:   Diagnosis Date    Abnormal CT scan, chest 03/14/2017    At [facility], subcarinal 3 cm appears to be left lobe thyroid MRI July 2017 [facility], Dr Haynes    Abnormal Pap smear of cervix     Anxiety 10/9/2013    Lexapro works well    Colon polyp     2016, repeat before 2021    HLD (hyperlipidemia) 10/9/2013    LDL 62 Lip 10    HTN (hypertension), benign 10/9/2013    Hyperlipidemia associated with type 2 diabetes mellitus 10/9/2013    Hypertension associated with diabetes 10/9/2013    Menopause     Uncontrolled type 2 diabetes mellitus with hyperglycemia, without long-term current use of insulin 3/14/2017     Past Surgical History:   Procedure Laterality Date    HYSTERECTOMY  2007    BARTOLOME/BSO for fibroids    neck needle biopsy  2017    thyroid nodule  2009    DR Haynes     Social History     Social History Narrative    Works with insurance, 4 cats, No children, nonsmoker, ETOH rarely, GYN Bone, colon polyp 2016, repeat  "before 2021     Family History   Problem Relation Age of Onset    Heart attack Father     Cervical cancer Mother     Diabetes Maternal Grandmother     Hyperlipidemia Brother     Obesity Brother     Colon cancer Neg Hx     Breast cancer Neg Hx      Vitals:    02/12/19 1523   BP: 118/64   Pulse: 79   Temp: 98.4 °F (36.9 °C)   TempSrc: Oral   Weight: 99.5 kg (219 lb 5.7 oz)   Height: 5' 2" (1.575 m)   PainSc: 0-No pain     Objective:   Physical Exam   Constitutional: She is oriented to person, place, and time. She appears well-developed and well-nourished.   HENT:   Head: Normocephalic and atraumatic.   Right Ear: External ear normal.   Left Ear: External ear normal.   Nose: Nose normal.   Mouth/Throat: Oropharynx is clear and moist.   Eyes: EOM are normal. Pupils are equal, round, and reactive to light.   Neck: Neck supple. No thyromegaly present.   Cardiovascular: Normal rate, regular rhythm, normal heart sounds and intact distal pulses.   No murmur heard.  Pulmonary/Chest: Effort normal and breath sounds normal. She has no wheezes.   Abdominal: Soft. Bowel sounds are normal. She exhibits no distension and no mass. There is no tenderness. There is no rebound and no guarding.   Musculoskeletal: She exhibits no edema.        Right foot: There is normal range of motion and no deformity.        Left foot: There is normal range of motion and no deformity.        Feet:   Right Foot:   Protective Sensation: 5 sites tested. 5 sites sensed.   Skin Integrity: Negative for ulcer, blister, skin breakdown, erythema or warmth.   Left Foot:   Protective Sensation: 5 sites tested. 5 sites sensed.   Skin Integrity: Negative for ulcer, blister, skin breakdown, erythema or warmth.   Lymphadenopathy:     She has no cervical adenopathy.   Neurological: She is alert and oriented to person, place, and time.   Skin: Skin is warm and dry.   Psychiatric: She has a normal mood and affect. Her behavior is normal.     Assessment:     1. " Controlled type 2 diabetes mellitus without complication, without long-term current use of insulin    2. Hypertension associated with diabetes    3. Hyperlipidemia associated with type 2 diabetes mellitus    4. Anxiety    5. Uncontrolled type 2 diabetes mellitus with hyperglycemia, without long-term current use of insulin      Plan:     Orders Placed This Encounter    Hemoglobin A1c    Microalbumin/creatinine urine ratio       Patient Instructions   Continue medications    FOR  DIABETES:  ==================================  SEE ME every  6 MONTHS with  BLOODWORK one week PRIOR  ===================================    Your 1# medicine is  EXERCISE  - huffing & puffing for 20  MINUTES EVERY DAY - check your sugars & you'll see them go down    The future risks of uncontrolled diabetes - include heart attack, stroke, neuropathy (pain in hands & feet that could lead to infection and amputation), nephropathy (leading to kidney dialysis) , and blindness     To prevent complications that can be treated early, please see your  opthalmologist EYE DOCTOR YEARLY      Always wear protective SHOES    Focus on NO SUGAR and no sugar substitutes (splenda,s tevia, etc) IN YOUR DRINKS. With respect to food - LOW CARBOHYDRATES - switch to whole wheat & brown rice.    Decrease & try to stop ALCOHOL, WHITE BREAD, WHITE PASTA, WHITE RICE , WHITE POTATOES- which all turn into sugar.    EAT an EXTRA VEGETABLE A DAY than you already do.    The ADA recommends taking  1. Aspirin 81 mg daily (unless you have had bleeding stomach ulcers or allergy to this)  2. STATIN medication (atorvastatin, pravastatin, rosuvastatin) to decrease inflammation in your blood vessels caused by SUGAR to prevent future heart attack & stroke. This is recommended even if your LDL cholesterol is <100.   3. ARB blood pressure medication (Losartan) to protect your kidneys from future dialysis from SUGAR. This is recommended even if you have normal blood  pressure    Consider reading the book -  End Of Diabetes by Dr Valadez    Once YEARLY I will check basic labs CBC, CMP, fasting lipids, TSH, Hga1C prior to your visit      ----------------------------                                    Answers for HPI/ROS submitted by the patient on 2/12/2019   Diabetes problem  Diabetes type: type 2  MedicAlert ID: No  Disease duration: 21 years  blurred vision: No  chest pain: No  fatigue: No  foot paresthesias: No  foot ulcerations: No  polydipsia: No  polyphagia: No  polyuria: No  visual change: No  weakness: No  weight loss: No  Symptom course: stable  confusion: No  dizziness: No  headaches: No  hunger: No  mood changes: No  nervous/anxious: No  pallor: No  seizures: No  sleepiness: No  speech difficulty: No  sweats: No  tremors: No  blackouts: No  hospitalization: No  nocturnal hypoglycemia: No  required assistance: No  required glucagon: No  CVA: No  heart disease: No  impotence: No  nephropathy: No  peripheral neuropathy: No  PVD: No  retinopathy: No  autonomic neuropathy: No  CAD risks: family history, obesity, post-menopausal  Current treatments: oral agent (dual therapy)  Treatment compliance: most of the time  Home blood tests: 1-2 x per week  Monitoring compliance: inadequate  Blood glucose trend: increasing steadily  Weight trend: stable  Current diet: generally healthy  Meal planning: avoidance of concentrated sweets  Exercise: intermittently  Dietitian visit: No  Eye exam current: Yes  Sees podiatrist: No

## 2019-02-13 DIAGNOSIS — E11.65 UNCONTROLLED TYPE 2 DIABETES MELLITUS WITH HYPERGLYCEMIA, WITHOUT LONG-TERM CURRENT USE OF INSULIN: ICD-10-CM

## 2019-02-13 LAB
ALBUMIN/CREAT UR: NORMAL UG/MG
CREAT UR-MCNC: 18 MG/DL
MICROALBUMIN UR DL<=1MG/L-MCNC: <2.5 UG/ML

## 2019-02-13 RX ORDER — METFORMIN HYDROCHLORIDE 500 MG/1
TABLET ORAL
Qty: 360 TABLET | Refills: 0 | Status: SHIPPED | OUTPATIENT
Start: 2019-02-13 | End: 2019-05-12 | Stop reason: SDUPTHER

## 2019-02-22 DIAGNOSIS — Z12.39 BREAST CANCER SCREENING: ICD-10-CM

## 2019-03-28 ENCOUNTER — OFFICE VISIT (OUTPATIENT)
Dept: OBSTETRICS AND GYNECOLOGY | Facility: CLINIC | Age: 54
End: 2019-03-28
Payer: COMMERCIAL

## 2019-03-28 VITALS
WEIGHT: 207.25 LBS | SYSTOLIC BLOOD PRESSURE: 104 MMHG | HEIGHT: 62 IN | BODY MASS INDEX: 38.14 KG/M2 | DIASTOLIC BLOOD PRESSURE: 62 MMHG

## 2019-03-28 DIAGNOSIS — Z01.419 ENCOUNTER FOR GYNECOLOGICAL EXAMINATION: Primary | ICD-10-CM

## 2019-03-28 PROCEDURE — 3074F SYST BP LT 130 MM HG: CPT | Mod: CPTII,S$GLB,, | Performed by: OBSTETRICS & GYNECOLOGY

## 2019-03-28 PROCEDURE — 99999 PR PBB SHADOW E&M-EST. PATIENT-LVL III: ICD-10-PCS | Mod: PBBFAC,,, | Performed by: OBSTETRICS & GYNECOLOGY

## 2019-03-28 PROCEDURE — 3074F PR MOST RECENT SYSTOLIC BLOOD PRESSURE < 130 MM HG: ICD-10-PCS | Mod: CPTII,S$GLB,, | Performed by: OBSTETRICS & GYNECOLOGY

## 2019-03-28 PROCEDURE — 3078F PR MOST RECENT DIASTOLIC BLOOD PRESSURE < 80 MM HG: ICD-10-PCS | Mod: CPTII,S$GLB,, | Performed by: OBSTETRICS & GYNECOLOGY

## 2019-03-28 PROCEDURE — 99999 PR PBB SHADOW E&M-EST. PATIENT-LVL III: CPT | Mod: PBBFAC,,, | Performed by: OBSTETRICS & GYNECOLOGY

## 2019-03-28 PROCEDURE — 99396 PR PREVENTIVE VISIT,EST,40-64: ICD-10-PCS | Mod: S$GLB,,, | Performed by: OBSTETRICS & GYNECOLOGY

## 2019-03-28 PROCEDURE — 99396 PREV VISIT EST AGE 40-64: CPT | Mod: S$GLB,,, | Performed by: OBSTETRICS & GYNECOLOGY

## 2019-03-28 PROCEDURE — 3078F DIAST BP <80 MM HG: CPT | Mod: CPTII,S$GLB,, | Performed by: OBSTETRICS & GYNECOLOGY

## 2019-03-28 NOTE — PROGRESS NOTES
"[facility] Complaint Well woman exam:  Well Woman (last vag. pap/hpv  13, Neg   --  last mmg 3-1-19, birads 2 ([facility])  --  colonoscopy , benign polyp  --  dexa 18, Normal )      Yamileth Wong is a 53 y.o. female  presents for a well woman exam.    She is established.  No complaints.   Specifically, patient denies abnormal vaginal bleeding, discharge and odor", or "pelvic pain.     LMP: none  S/p BARTOLOME and BSO   Last pap: s/p Hyst No pap needed   Last MMG: 3/19 [facility] birads 2  Last colonoscopy:  Dr Morales Benign polyp  Last bone density 2018 normal diagnostic imaging      Current Outpatient Medications:     albuterol 90 mcg/actuation inhaler, Inhale 2 puffs into the lungs every 4 (four) hours as needed for Wheezing. Use with spacer, Disp: 1 Inhaler, Rfl: 11    antipyrine-benzocaine (AURALGAN OR EQUIV) 5.4-1.4 % Drop, Place 3 drops into both ears 2 (two) times daily as needed. , Disp: , Rfl: 0    atorvastatin (LIPITOR) 10 MG tablet, TAKE 1 TABLET BY MOUTH ONCE DAILY, Disp: 90 tablet, Rfl: 3    escitalopram oxalate (LEXAPRO) 20 MG tablet, TAKE 1 TABLET BY MOUTH DAILY, Disp: 90 tablet, Rfl: 3    glipiZIDE (GLUCOTROL) 10 MG TR24, TAKE 1 TABLET BY MOUTH EVERY DAY IN THE MORNING WITH BREAKFAST, Disp: 90 tablet, Rfl: 0    lisinopril-hydrochlorothiazide (PRINZIDE,ZESTORETIC) 10-12.5 mg per tablet, TAKE 1 TABLET BY MOUTH EVERY DAY, Disp: 90 tablet, Rfl: 3    metFORMIN (GLUCOPHAGE) 500 MG tablet, TAKE 2 TABLETS BY MOUTH TWICE DAILY WITH MEALS, Disp: 360 tablet, Rfl: 0    blood sugar diagnostic (ACCU-CHEK FAITH PLUS TEST STRP) Strp, TEST BLOOD SUGAR TWICE DAILY, Disp: 200 strip, Rfl: 3    blood-glucose meter kit, Use as instructed, Disp: 1 each, Rfl: 0    lancets (ACCU-CHEK SOFTCLIX LANCETS) Misc, TEST BLOOD SUGAR TWICE DAILY, Disp: 200 each, Rfl: 3    Past Medical History:   Diagnosis Date    Abnormal CT scan, chest 2017    At [facility], subcarinal 3 cm appears to be left lobe thyroid " MRI 2017 [facility], Dr Haynes    Abnormal Pap smear of cervix     Anxiety 10/9/2013    Lexapro works well    Colon polyp     , repeat before     HLD (hyperlipidemia) 10/9/2013    LDL 62 Lip 10    Hot thyroid nodule     Dr Haynes    HTN (hypertension), benign 10/9/2013    Hyperlipidemia associated with type 2 diabetes mellitus 10/9/2013    Hypertension associated with diabetes 10/9/2013    Menopause     Uncontrolled type 2 diabetes mellitus with hyperglycemia, without long-term current use of insulin 3/14/2017    Vaginal Pap smear 2013    Pap/Hpv negative  (Dr Garrison, [facility])        Past Surgical History:   Procedure Laterality Date    HYSTERECTOMY      BARTOLOME/BSO for fibroids    NECK LESION BIOPSY      Neck needle Bx -- Thyroid Nodule     [facility]      [facility]       OB History    Para Term  AB Living   0 0 0 0 0 0   SAB TAB Ectopic Multiple Live Births   0 0 0 0         Family History   Problem Relation Age of Onset    Heart attack Father     Cervical cancer Mother     Diabetes Maternal Grandmother     Hyperlipidemia Brother     Obesity Brother     Colon cancer Neg Hx     Breast cancer Neg Hx        Social History     Tobacco Use    Smoking status: Never Smoker    Smokeless tobacco: Never Used   Substance Use Topics    Alcohol use: Yes     Comment: Social     Drug use: No         ROS:    GENERAL: Denies weight gain or weight loss. Feeling well overall.   SKIN: Denies rash or lesions.   HEENT: Denies headaches, or vision changes.   CARDIOVASCULAR: Denies palpitations or left sided chest pain.   RESPIRATORY: Denies shortness of breath or dyspnea on exertion.  BREASTS: Denies pain, lumps, or nipple discharge.   ABDOMEN: Denies abdominal pain, constipation, diarrhea, nausea, vomiting, change in appetite or rectal bleeding.   URINARY: Denies frequency, dysuria, hematuria.  NEUROLOGIC: Denies syncope or weakness.   PSYCHIATRIC: Denies  "depression, anxiety or mood swings.    Physical Exam:  /62   Ht 5' 2" (1.575 m)   Wt 94 kg (207 lb 3.7 oz)   LMP  (LMP Unknown) Comment: BARTOLOME/BSO  2007  BMI 37.90 kg/m²     APPEARANCE: Well nourished, well developed, in no acute distress.  AFFECT: WNL, alert and oriented x 3  SKIN: No acne or hirsutism  BREASTS: Symmetrical, no skin changes.                     No nipple discharge. No palpable masses bilaterally  NODES: No inguinal nor axillary LAD  ABDOMEN: soft Non tender Non distended No masses  PELVIC:   Normal external genitalia without lesions.   Normal urethral meatus. No signif cystocele or rectocele.  Vagina atrophic without lesions or discharge.  Cuff intact  Cervix absent  Adnexa without masses or tenderness.    EXTREMITIES: No edema.    ASSESSMENT AND PLAN  Yamileth was seen today for well woman.    Diagnoses and all orders for this visit:    Encounter for gynecological examination   Normal exam           Patient was counseled today on A.C.S. Pap guidelines and recommendations for yearly pelvic exams, mammograms and monthly self breast exams; to see her PCP for other health maintenance.     Patient encouraged to register for portal and results will be sent via portal.    Follow up in about 1 year (around 3/28/2020).         Health Maintenance   Topic Date Due    Influenza Vaccine  08/01/2018    Mammogram  02/13/2019    Hemoglobin A1c  07/31/2019    Eye Exam  09/25/2019    Lipid Panel  01/31/2020    Low Dose Statin  02/12/2020    Foot Exam  02/12/2020    Colonoscopy  03/18/2026    TETANUS VACCINE  03/08/2028    Hepatitis C Screening  Completed    Pneumococcal Vaccine (Medium Risk)  Completed                   "

## 2019-04-08 ENCOUNTER — PATIENT OUTREACH (OUTPATIENT)
Dept: ADMINISTRATIVE | Facility: HOSPITAL | Age: 54
End: 2019-04-08

## 2019-04-10 RX ORDER — GLIPIZIDE 10 MG/1
TABLET, FILM COATED, EXTENDED RELEASE ORAL
Qty: 90 TABLET | Refills: 0 | Status: SHIPPED | OUTPATIENT
Start: 2019-04-10 | End: 2019-07-08 | Stop reason: SDUPTHER

## 2019-04-12 ENCOUNTER — TELEPHONE (OUTPATIENT)
Dept: ADMINISTRATIVE | Facility: HOSPITAL | Age: 54
End: 2019-04-12

## 2019-05-07 ENCOUNTER — LAB VISIT (OUTPATIENT)
Dept: LAB | Facility: HOSPITAL | Age: 54
End: 2019-05-07
Attending: FAMILY MEDICINE
Payer: COMMERCIAL

## 2019-05-07 DIAGNOSIS — E11.65 UNCONTROLLED TYPE 2 DIABETES MELLITUS WITH HYPERGLYCEMIA, WITHOUT LONG-TERM CURRENT USE OF INSULIN: ICD-10-CM

## 2019-05-07 LAB
ESTIMATED AVG GLUCOSE: 120 MG/DL (ref 68–131)
HBA1C MFR BLD HPLC: 5.8 % (ref 4–5.6)

## 2019-05-07 PROCEDURE — 83036 HEMOGLOBIN GLYCOSYLATED A1C: CPT

## 2019-05-07 PROCEDURE — 36415 COLL VENOUS BLD VENIPUNCTURE: CPT | Mod: PO

## 2019-05-08 ENCOUNTER — PATIENT MESSAGE (OUTPATIENT)
Dept: PRIMARY CARE CLINIC | Facility: CLINIC | Age: 54
End: 2019-05-08

## 2019-05-08 RX ORDER — INSULIN PUMP SYRINGE, 3 ML
EACH MISCELLANEOUS
Qty: 1 EACH | Refills: 0 | Status: SHIPPED | OUTPATIENT
Start: 2019-05-08 | End: 2022-08-24 | Stop reason: SDUPTHER

## 2019-05-08 RX ORDER — LANCETS
EACH MISCELLANEOUS
Qty: 100 EACH | Refills: 3 | Status: SHIPPED | OUTPATIENT
Start: 2019-05-08

## 2019-05-12 DIAGNOSIS — E11.65 UNCONTROLLED TYPE 2 DIABETES MELLITUS WITH HYPERGLYCEMIA, WITHOUT LONG-TERM CURRENT USE OF INSULIN: ICD-10-CM

## 2019-05-13 RX ORDER — METFORMIN HYDROCHLORIDE 500 MG/1
TABLET ORAL
Qty: 360 TABLET | Refills: 0 | Status: SHIPPED | OUTPATIENT
Start: 2019-05-13 | End: 2019-08-09 | Stop reason: SDUPTHER

## 2019-07-09 RX ORDER — GLIPIZIDE 10 MG/1
TABLET, FILM COATED, EXTENDED RELEASE ORAL
Qty: 90 TABLET | Refills: 0 | Status: SHIPPED | OUTPATIENT
Start: 2019-07-09 | End: 2019-10-05 | Stop reason: SDUPTHER

## 2019-08-09 DIAGNOSIS — E11.65 UNCONTROLLED TYPE 2 DIABETES MELLITUS WITH HYPERGLYCEMIA, WITHOUT LONG-TERM CURRENT USE OF INSULIN: ICD-10-CM

## 2019-08-09 RX ORDER — METFORMIN HYDROCHLORIDE 500 MG/1
TABLET ORAL
Qty: 360 TABLET | Refills: 3 | Status: SHIPPED | OUTPATIENT
Start: 2019-08-09 | End: 2020-07-27

## 2019-10-07 RX ORDER — GLIPIZIDE 10 MG/1
TABLET, FILM COATED, EXTENDED RELEASE ORAL
Qty: 90 TABLET | Refills: 2 | Status: SHIPPED | OUTPATIENT
Start: 2019-10-07 | End: 2020-06-30

## 2019-10-10 ENCOUNTER — OFFICE VISIT (OUTPATIENT)
Dept: PRIMARY CARE CLINIC | Facility: CLINIC | Age: 54
End: 2019-10-10
Payer: COMMERCIAL

## 2019-10-10 VITALS
SYSTOLIC BLOOD PRESSURE: 115 MMHG | BODY MASS INDEX: 37.04 KG/M2 | DIASTOLIC BLOOD PRESSURE: 63 MMHG | TEMPERATURE: 99 F | OXYGEN SATURATION: 98 % | HEIGHT: 62 IN | WEIGHT: 201.25 LBS | HEART RATE: 85 BPM | RESPIRATION RATE: 15 BRPM

## 2019-10-10 DIAGNOSIS — J01.90 ACUTE RHINOSINUSITIS: Primary | ICD-10-CM

## 2019-10-10 DIAGNOSIS — R05.9 COUGH: ICD-10-CM

## 2019-10-10 DIAGNOSIS — E11.9 CONTROLLED TYPE 2 DIABETES MELLITUS WITHOUT COMPLICATION, WITHOUT LONG-TERM CURRENT USE OF INSULIN: ICD-10-CM

## 2019-10-10 DIAGNOSIS — I15.2 HYPERTENSION ASSOCIATED WITH DIABETES: ICD-10-CM

## 2019-10-10 DIAGNOSIS — E11.59 HYPERTENSION ASSOCIATED WITH DIABETES: ICD-10-CM

## 2019-10-10 PROCEDURE — 96372 THER/PROPH/DIAG INJ SC/IM: CPT | Mod: S$GLB,,, | Performed by: INTERNAL MEDICINE

## 2019-10-10 PROCEDURE — 99214 OFFICE O/P EST MOD 30 MIN: CPT | Mod: 25,S$GLB,, | Performed by: INTERNAL MEDICINE

## 2019-10-10 PROCEDURE — 99999 PR PBB SHADOW E&M-EST. PATIENT-LVL V: CPT | Mod: PBBFAC,,, | Performed by: INTERNAL MEDICINE

## 2019-10-10 PROCEDURE — 96372 PR INJECTION,THERAP/PROPH/DIAG2ST, IM OR SUBCUT: ICD-10-PCS | Mod: S$GLB,,, | Performed by: INTERNAL MEDICINE

## 2019-10-10 PROCEDURE — 3044F HG A1C LEVEL LT 7.0%: CPT | Mod: CPTII,S$GLB,, | Performed by: INTERNAL MEDICINE

## 2019-10-10 PROCEDURE — 3074F PR MOST RECENT SYSTOLIC BLOOD PRESSURE < 130 MM HG: ICD-10-PCS | Mod: CPTII,S$GLB,, | Performed by: INTERNAL MEDICINE

## 2019-10-10 PROCEDURE — 99214 PR OFFICE/OUTPT VISIT, EST, LEVL IV, 30-39 MIN: ICD-10-PCS | Mod: 25,S$GLB,, | Performed by: INTERNAL MEDICINE

## 2019-10-10 PROCEDURE — 3008F BODY MASS INDEX DOCD: CPT | Mod: CPTII,S$GLB,, | Performed by: INTERNAL MEDICINE

## 2019-10-10 PROCEDURE — 3008F PR BODY MASS INDEX (BMI) DOCUMENTED: ICD-10-PCS | Mod: CPTII,S$GLB,, | Performed by: INTERNAL MEDICINE

## 2019-10-10 PROCEDURE — 3074F SYST BP LT 130 MM HG: CPT | Mod: CPTII,S$GLB,, | Performed by: INTERNAL MEDICINE

## 2019-10-10 PROCEDURE — 3044F PR MOST RECENT HEMOGLOBIN A1C LEVEL <7.0%: ICD-10-PCS | Mod: CPTII,S$GLB,, | Performed by: INTERNAL MEDICINE

## 2019-10-10 PROCEDURE — 99999 PR PBB SHADOW E&M-EST. PATIENT-LVL V: ICD-10-PCS | Mod: PBBFAC,,, | Performed by: INTERNAL MEDICINE

## 2019-10-10 PROCEDURE — 3078F PR MOST RECENT DIASTOLIC BLOOD PRESSURE < 80 MM HG: ICD-10-PCS | Mod: CPTII,S$GLB,, | Performed by: INTERNAL MEDICINE

## 2019-10-10 PROCEDURE — 3078F DIAST BP <80 MM HG: CPT | Mod: CPTII,S$GLB,, | Performed by: INTERNAL MEDICINE

## 2019-10-10 RX ORDER — TRIAMCINOLONE ACETONIDE 40 MG/ML
40 INJECTION, SUSPENSION INTRA-ARTICULAR; INTRAMUSCULAR
Status: COMPLETED | OUTPATIENT
Start: 2019-10-10 | End: 2019-10-10

## 2019-10-10 RX ORDER — PROMETHAZINE HYDROCHLORIDE AND CODEINE PHOSPHATE 6.25; 1 MG/5ML; MG/5ML
5 SOLUTION ORAL EVERY 8 HOURS PRN
Qty: 100 ML | Refills: 0 | Status: SHIPPED | OUTPATIENT
Start: 2019-10-10 | End: 2019-10-20

## 2019-10-10 RX ORDER — LISINOPRIL AND HYDROCHLOROTHIAZIDE 10; 12.5 MG/1; MG/1
TABLET ORAL
Qty: 90 TABLET | Refills: 3 | Status: SHIPPED | OUTPATIENT
Start: 2019-10-10 | End: 2020-07-29

## 2019-10-10 RX ADMIN — TRIAMCINOLONE ACETONIDE 40 MG: 40 INJECTION, SUSPENSION INTRA-ARTICULAR; INTRAMUSCULAR at 12:10

## 2019-10-10 NOTE — PROGRESS NOTES
Two patient identifiers verified.  Allergies reviewed. Kenalog 40mg IM administered to right gluteal per MD order.  Patient tolerated injection well; no redness, bleeding, or bruising noted to injection site.  Patient instructed to remain in clinic setting for 15 minutes.  Verbalizes understanding.

## 2019-10-10 NOTE — PROGRESS NOTES
[facility] Note    Chief Complaint      Chief Complaint   Patient presents with    Cough       History of Present Illness      Yamileth Wong is a 54 y.o. WF with HTN presents for URI Sx's x 5 days.     DM - II - recent Ha1c - 5.8  - controlled on Metformin 500 mg 2 tabs BID and Glipizide ER 10 mg/d.  Pt will monitor glc closely nad follow a low carb diet as I am giving her a kenalog inj today.    URI - Sx's started 5 days ago with sore throat and myalgias. +Subj fever.  Progressed to cough prod yellow/green.  Pt with hoarseness and c/o postnasal drip.  Pt has been taking Aleve, Nyquil, and Mucinex prn.  +Sick contacts at work.     HTN - Controlled.  Pt avoids oral decongestants    HCM - Flu- none; PCP - Dr. Dickerson    Past Medical History:  Past Medical History:   Diagnosis Date    Abnormal CT scan, chest 03/14/2017    At [facility], subcarinal 3 cm appears to be left lobe thyroid MRI July 2017 [facility], Dr Haynes    Abnormal Pap smear of cervix     Anxiety 10/9/2013    Lexapro works well    Colon polyp     2016, repeat before 2021    HLD (hyperlipidemia) 10/9/2013    LDL 62 Lip 10    Hot thyroid nodule 2009    Dr Haynes    HTN (hypertension), benign 10/9/2013    Hyperlipidemia associated with type 2 diabetes mellitus 10/9/2013    Hypertension associated with diabetes 10/9/2013    Menopause     Uncontrolled type 2 diabetes mellitus with hyperglycemia, without long-term current use of insulin 3/14/2017    Vaginal Pap smear 02/25/2013    Pap/Hpv negative  ([place] Garrison[place] [facility] [place]        Past Surgical History:   has a past surgical history that includes Neck lesion biopsy (2017); Hysterectomy (2007); and Thyroidectomy, partial (2010).    Family History:  family history includes Cervical cancer in her mother; Diabetes in her maternal grandmother; Heart attack in her father; Hyperlipidemia in her brother; Obesity in her brother.     Social History:  Social History     Tobacco Use    Smoking  status: Never Smoker    Smokeless tobacco: Never Used   Substance Use Topics    Alcohol use: Yes     Comment: Social     Drug use: No       Review of Systems   Constitutional: Negative for chills and fever.   HENT: Positive for congestion, sinus pain and sore throat. Negative for ear pain.         Postnasal drip   Eyes: Negative for blurred vision and double vision.   Respiratory: Positive for cough. Negative for shortness of breath and wheezing.    Cardiovascular: Negative for chest pain and palpitations.   Gastrointestinal: Negative for abdominal pain, constipation, diarrhea, heartburn, nausea and vomiting.   Musculoskeletal: Positive for myalgias. Negative for joint pain.        Legs and arms 5 days ago when Sx's started.    Neurological: Positive for headaches. Negative for dizziness.        Frontal - mild        Medications:  Outpatient Encounter Medications as of 10/10/2019   Medication Sig Note Dispense Refill    atorvastatin (LIPITOR) 10 MG tablet TAKE 1 TABLET BY MOUTH ONCE DAILY  90 tablet 3    blood sugar diagnostic (ACCU-CHEK FAITH PLUS TEST STRP) Strp TEST BLOOD SUGAR TWICE DAILY  200 strip 3    blood sugar diagnostic (BLOOD GLUCOSE TEST) Strp 1 strip by Misc.(Non-Drug; Combo Route) route 2 (two) times daily.  100 strip 12    blood-glucose meter kit Use as instructed  1 each 0    blood-glucose meter kit Use as instructed  1 each 0    escitalopram oxalate (LEXAPRO) 20 MG tablet TAKE 1 TABLET BY MOUTH DAILY  90 tablet 3    glipiZIDE (GLUCOTROL) 10 MG TR24 TAKE 1 TABLET BY MOUTH EVERY MORNING WITH BREAKFAST  90 tablet 2    lancets (ACCU-CHEK SOFTCLIX LANCETS) Misc TEST BLOOD SUGAR TWICE DAILY  200 each 3    lancets Misc Test bid  100 each 3    lisinopril-hydrochlorothiazide (PRINZIDE,ZESTORETIC) 10-12.5 mg per tablet TAKE 1 TABLET BY MOUTH EVERY DAY  90 tablet 3    metFORMIN (GLUCOPHAGE) 500 MG tablet TAKE 2 TABLETS BY MOUTH TWICE DAILY WITH MEALS  360 tablet 3    albuterol 90 mcg/actuation  inhaler Inhale 2 puffs into the lungs every 4 (four) hours as needed for Wheezing. Use with spacer (Patient not taking: Reported on 10/10/2019)  1 Inhaler 11    antipyrine-benzocaine (AURALGAN OR EQUIV) 5.4-1.4 % Drop Place 3 drops into both ears 2 (two) times daily as needed.  12/16/2015: Received from: External Pharmacy  0     No facility-administered encounter medications on file as of 10/10/2019.        Current Outpatient Medications:     atorvastatin (LIPITOR) 10 MG tablet, TAKE 1 TABLET BY MOUTH ONCE DAILY, Disp: 90 tablet, Rfl: 3    blood sugar diagnostic (ACCU-CHEK FAITH PLUS TEST STRP) Strp, TEST BLOOD SUGAR TWICE DAILY, Disp: 200 strip, Rfl: 3    blood sugar diagnostic (BLOOD GLUCOSE TEST) Strp, 1 strip by Misc.(Non-Drug; Combo Route) route 2 (two) times daily., Disp: 100 strip, Rfl: 12    blood-glucose meter kit, Use as instructed, Disp: 1 each, Rfl: 0    blood-glucose meter kit, Use as instructed, Disp: 1 each, Rfl: 0    escitalopram oxalate (LEXAPRO) 20 MG tablet, TAKE 1 TABLET BY MOUTH DAILY, Disp: 90 tablet, Rfl: 3    glipiZIDE (GLUCOTROL) 10 MG TR24, TAKE 1 TABLET BY MOUTH EVERY MORNING WITH BREAKFAST, Disp: 90 tablet, Rfl: 2    lancets (ACCU-CHEK SOFTCLIX LANCETS) Misc, TEST BLOOD SUGAR TWICE DAILY, Disp: 200 each, Rfl: 3    lancets Misc, Test bid, Disp: 100 each, Rfl: 3    lisinopril-hydrochlorothiazide (PRINZIDE,ZESTORETIC) 10-12.5 mg per tablet, TAKE 1 TABLET BY MOUTH EVERY DAY, Disp: 90 tablet, Rfl: 3    metFORMIN (GLUCOPHAGE) 500 MG tablet, TAKE 2 TABLETS BY MOUTH TWICE DAILY WITH MEALS, Disp: 360 tablet, Rfl: 3    albuterol 90 mcg/actuation inhaler, Inhale 2 puffs into the lungs every 4 (four) hours as needed for Wheezing. Use with spacer (Patient not taking: Reported on 10/10/2019), Disp: 1 Inhaler, Rfl: 11    antipyrine-benzocaine (AURALGAN OR EQUIV) 5.4-1.4 % Drop, Place 3 drops into both ears 2 (two) times daily as needed. , Disp: , Rfl: 0    Allergies:  Review of patient's  "allergies indicates:   Allergen Reactions    Latex Rash    Penicillins Rash       Health Maintenance:  Immunization History   Administered Date(s) Administered    Pneumococcal Polysaccharide - 23 Valent 03/08/2018    Tdap 03/08/2018      Health Maintenance   Topic Date Due    Eye Exam  09/25/2019    Hemoglobin A1c  11/07/2019    Lipid Panel  01/31/2020    Foot Exam  02/12/2020    Low Dose Statin  10/10/2020    Mammogram  03/01/2021    Colonoscopy  03/18/2026    TETANUS VACCINE  03/08/2028    Hepatitis C Screening  Completed    Pneumococcal Vaccine (Medium Risk)  Completed      Objective:      Vital Signs  Temp: 98.7 °F (37.1 °C)  Temp src: Oral  Pulse: 85  Resp: 15  SpO2: 98 %  BP: 115/63  BP Location: Right arm  Patient Position: Sitting  Pain Score: 0-No pain  Height and Weight  Height: 5' 2" (157.5 cm)  Weight: 91.3 kg (201 lb 4.5 oz)  BSA (Calculated - sq m): 2 sq meters  BMI (Calculated): 36.9  Weight in (lb) to have BMI = 25: 136.4]    Laboratory:  CBC:  Recent Labs   Lab 11/10/16  0730   WBC 7.77   RBC 4.52   Hemoglobin 12.5   Hematocrit 39.6   Platelets 257   Mean Corpuscular Volume 88   Mean Corpuscular Hemoglobin 27.7   Mean Corpuscular Hemoglobin Conc 31.6 L       CMP:  Recent Labs   Lab 11/10/16  0730   Glucose 142 H   Calcium 9.4   Albumin 3.7   Total Protein 7.1   Sodium 141   Potassium 5.1   CO2 25   Chloride 107   BUN, Bld 15   Creatinine 0.7   eGFR if African American >60.0   eGFR if non African American >60.0   Alkaline Phosphatase 50 L   ALT 18   AST 14   Total Bilirubin 0.4       URINALYSIS:              LIPIDS:  Recent Labs   Lab 11/10/16  0730 12/11/17  0712 01/31/19  0754   TSH 1.548  --   --    HDL 59 55 48   Cholesterol 147 140 127   Triglycerides 70 102 87   LDL Cholesterol 74.0 64.6 61.6 L   Hdl/Cholesterol Ratio 40.1 39.3 37.8   Non-HDL Cholesterol 88 85 79   Total Cholesterol/HDL Ratio 2.5 2.5 2.6       TSH:  Recent Labs   Lab 11/10/16  0730   TSH 1.548       A1C:  Recent " Labs   Lab 11/10/16  0730 03/07/17  0715 08/21/17  0756 03/02/18  0711 08/29/18  0707 01/31/19  0754 05/07/19  1107   Hemoglobin A1C 6.5 H 6.6 H 6.2 H 6.4 H 6.6 H 6.7 H 5.8 H       Urine Microalbumin/Cr:  Lab Results   Component Value Date    MICALBCREAT Unable to calculate 02/12/2019       Other:   No results found for: LH, FSH, TOTALTESTOST, PROGESTERONE, ESTRADIOL, [id number], [id number], FERRITIN, IRON, TRANSFERRIN, TIBC, FESATURATED, ZINC  Lab Results   Component Value Date    HEPCAB Negative 08/02/2016       Physical Exam   Constitutional: She appears well-developed and well-nourished. No distress.   HENT:   Head: Normocephalic and atraumatic.   Right Ear: External ear normal.   Left Ear: External ear normal.   No sinus TTP;  + cobblestoning;  Rt nasal turbinate inflammed > Left.  Almost obstructed on RT   Eyes: Pupils are equal, round, and reactive to light. Conjunctivae and EOM are normal.   No sinus TTP;  + cobblestoning;  Rt nasal turbinate inflammed > Left.  Almost obstructed on RT   Neck: Normal range of motion. Neck supple.   Healed ant neck surgical scar   Cardiovascular: Normal rate, regular rhythm, normal heart sounds and intact distal pulses.   No murmur heard.  Pulmonary/Chest: Effort normal and breath sounds normal. No respiratory distress.   Abdominal: Soft. Bowel sounds are normal. She exhibits no distension.   Skin: She is not diaphoretic.           Assessment:       No diagnosis found.    Yamileth Wong is a 54 y.o.female with:    1. Acute rhinosinusitis  - triamcinolone acetonide injection 40 mg  -Admin Kenalog 40 mg IM x 1 today.  Rec Zyrtec 10 mg/d.  If pt's sx's worsen she will alert me.  I do not feel Abx are warranted at this time.  I d/w pt at length.  Rec supportive care.     2. Cough  - promethazine-codeine 6.25-10 mg/5 ml (PHENERGAN WITH CODEINE) 6.25-10 mg/5 mL syrup; Take 5 mLs by mouth every 8 (eight) hours as needed for Cough.  Dispense: 100 mL; Refill: 0  -Will Rx  Promethazine codeine cough syrup to take 5 ml po QHS.  Pt aware she should not drive after taking.     3. Controlled type 2 diabetes mellitus without complication, without long-term current use of insulin  - Pt will monitor glc closely as I am admin Kenalog 40 mg IM x 1 today. She will call with any concerns.    4. Hypertension associated with diabetes  - BP may inc slightly after steroid inj - well controlled.  Pt does avoid oral decongestants.        Chronic conditions status updated as per HPI.  Other than changes above, cont current medications and maintain follow up with specialists.  Return to clinic with Dr. Dickerson as prev sched or sooner if needed.    Amy Em MD  [facility]

## 2019-11-14 DIAGNOSIS — E11.9 TYPE 2 DIABETES MELLITUS WITHOUT COMPLICATION: ICD-10-CM

## 2019-11-14 RX ORDER — ESCITALOPRAM OXALATE 20 MG/1
TABLET ORAL
Qty: 90 TABLET | Refills: 3 | Status: SHIPPED | OUTPATIENT
Start: 2019-11-14 | End: 2020-11-09 | Stop reason: SDUPTHER

## 2019-11-26 RX ORDER — ATORVASTATIN CALCIUM 10 MG/1
TABLET, FILM COATED ORAL
Qty: 90 TABLET | Refills: 3 | Status: SHIPPED | OUTPATIENT
Start: 2019-11-26 | End: 2020-11-18 | Stop reason: SDUPTHER

## 2019-12-30 ENCOUNTER — LAB VISIT (OUTPATIENT)
Dept: LAB | Facility: HOSPITAL | Age: 54
End: 2019-12-30
Attending: OBSTETRICS & GYNECOLOGY
Payer: COMMERCIAL

## 2019-12-30 ENCOUNTER — TELEPHONE (OUTPATIENT)
Dept: OBSTETRICS AND GYNECOLOGY | Facility: CLINIC | Age: 54
End: 2019-12-30

## 2019-12-30 DIAGNOSIS — R39.15 URINARY URGENCY: Primary | ICD-10-CM

## 2019-12-30 DIAGNOSIS — R39.15 URINARY URGENCY: ICD-10-CM

## 2019-12-30 PROCEDURE — 87088 URINE BACTERIA CULTURE: CPT

## 2019-12-30 PROCEDURE — 87086 URINE CULTURE/COLONY COUNT: CPT

## 2019-12-30 RX ORDER — NITROFURANTOIN 25; 75 MG/1; MG/1
100 CAPSULE ORAL 2 TIMES DAILY
Qty: 14 CAPSULE | Refills: 0 | Status: SHIPPED | OUTPATIENT
Start: 2019-12-30 | End: 2020-01-06

## 2019-12-30 NOTE — TELEPHONE ENCOUNTER
Pt thinks she has a UTI.  She c/o a tingling sensation at the end of urination as well as low back discomfort and abdominal pressure.  Requesting to drop off urine culture.  Scheduled today.    macrobid pended

## 2019-12-30 NOTE — TELEPHONE ENCOUNTER
Bone pt, says bladder infection started started Friday evening, says her lower back hurts, has urinary urgency and wants to know if she can drop off urine sample. Please call pt and advise, thank you.

## 2020-01-01 LAB — BACTERIA UR CULT: ABNORMAL

## 2020-01-05 NOTE — PROGRESS NOTES
Yamileth,  How you feeling??  You feel like you urinary tract infection has resolved??  Your culture has returned with bacteria that is normally a skin contaminant.  No obvious true urinary track infection organisms were identified.  If you are feeling better that wonderful and we will just continue to observe.  If you are having any other issues or problems we may need you to come into the office.  Hope you have wonderful weekend and let me know on Monday how you are feeling.  Dr Garrison

## 2020-01-06 ENCOUNTER — PATIENT MESSAGE (OUTPATIENT)
Dept: OBSTETRICS AND GYNECOLOGY | Facility: CLINIC | Age: 55
End: 2020-01-06

## 2020-02-06 DIAGNOSIS — E11.9 TYPE 2 DIABETES MELLITUS WITHOUT COMPLICATION: ICD-10-CM

## 2020-06-24 ENCOUNTER — TELEPHONE (OUTPATIENT)
Dept: PRIMARY CARE CLINIC | Facility: CLINIC | Age: 55
End: 2020-06-24

## 2020-06-25 ENCOUNTER — OFFICE VISIT (OUTPATIENT)
Dept: PRIMARY CARE CLINIC | Facility: CLINIC | Age: 55
End: 2020-06-25
Payer: COMMERCIAL

## 2020-06-25 DIAGNOSIS — E11.69 HYPERLIPIDEMIA ASSOCIATED WITH TYPE 2 DIABETES MELLITUS: ICD-10-CM

## 2020-06-25 DIAGNOSIS — E11.59 HYPERTENSION ASSOCIATED WITH DIABETES: ICD-10-CM

## 2020-06-25 DIAGNOSIS — I15.2 HYPERTENSION ASSOCIATED WITH DIABETES: ICD-10-CM

## 2020-06-25 DIAGNOSIS — R63.5 WEIGHT GAIN: ICD-10-CM

## 2020-06-25 DIAGNOSIS — E11.9 CONTROLLED TYPE 2 DIABETES MELLITUS WITHOUT COMPLICATION, WITHOUT LONG-TERM CURRENT USE OF INSULIN: Primary | ICD-10-CM

## 2020-06-25 DIAGNOSIS — K63.5 POLYP OF COLON, UNSPECIFIED PART OF COLON, UNSPECIFIED TYPE: ICD-10-CM

## 2020-06-25 DIAGNOSIS — E78.5 HYPERLIPIDEMIA ASSOCIATED WITH TYPE 2 DIABETES MELLITUS: ICD-10-CM

## 2020-06-25 PROCEDURE — 99214 PR OFFICE/OUTPT VISIT, EST, LEVL IV, 30-39 MIN: ICD-10-PCS | Mod: 95,,, | Performed by: FAMILY MEDICINE

## 2020-06-25 PROCEDURE — 99214 OFFICE O/P EST MOD 30 MIN: CPT | Mod: 95,,, | Performed by: FAMILY MEDICINE

## 2020-06-25 NOTE — PROGRESS NOTES
Subjective:      Patient ID: Yamileth Wong is a 54 y.o. female.    Chief Complaint: No chief complaint on file.    Audio Only Telehealth Visit     The patient location is: home  The chief complaint leading to consultation is: DM  Visit type: Virtual visit with audio only (telephone)  Total time spent with patient: 7 min     The reason for the audio only service rather than synchronous audio and video virtual visit was related to technical difficulties or patient preference/necessity.     Each patient to whom I provide medical services by telemedicine is:  (1) informed of the relationship between the physician and patient and the respective role of any other health care provider with respect to management of the patient; and (2) notified that they may decline to receive medical services by telemedicine and may withdraw from such care at any time. Patient verbally consented to receive this service via voice-only telephone call.       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 Taking glipizide 10 & metformin 1 g bid.     Denies acute symptoms such as nocturia, polyuria, unexplained weight loss or blurred vision.    Eye evaluation   - [facility] Wellington over a year    Urine microalbumin - neg    Denies numbness, tingling sensation, or known h/o peripheral neuropathy.     Denies  Chest pain, shortness of breath.       Assessment and plan:                        This service was not originating from a related E/M service provided within the previous 7 days nor will  to an E/M service or procedure within the next 24 hours or my soonest available appointment.  Prevailing standard of care was able to be met in this audio-only visit.          Current Outpatient Medications:     albuterol 90 mcg/actuation inhaler, Inhale 2 puffs into the lungs every 4 (four) hours as needed for Wheezing. Use with spacer (Patient not taking: Reported on 10/10/2019), Disp: 1 Inhaler,  Rfl: 11    antipyrine-benzocaine (AURALGAN OR EQUIV) 5.4-1.4 % Drop, Place 3 drops into both ears 2 (two) times daily as needed. , Disp: , Rfl: 0    atorvastatin (LIPITOR) 10 MG tablet, TAKE 1 TABLET BY MOUTH ONCE DAILY, Disp: 90 tablet, Rfl: 3    blood sugar diagnostic (ACCU-CHEK FAITH PLUS TEST STRP) Strp, TEST BLOOD SUGAR TWICE DAILY, Disp: 200 strip, Rfl: 3    blood sugar diagnostic (BLOOD GLUCOSE TEST) Strp, 1 strip by Misc.(Non-Drug; Combo Route) route 2 (two) times daily., Disp: 100 strip, Rfl: 12    blood-glucose meter kit, Use as instructed, Disp: 1 each, Rfl: 0    blood-glucose meter kit, Use as instructed, Disp: 1 each, Rfl: 0    escitalopram oxalate (LEXAPRO) 20 MG tablet, TAKE 1 TABLET BY MOUTH DAILY, Disp: 90 tablet, Rfl: 3    glipiZIDE (GLUCOTROL) 10 MG TR24, TAKE 1 TABLET BY MOUTH EVERY MORNING WITH BREAKFAST, Disp: 90 tablet, Rfl: 2    lancets (ACCU-CHEK SOFTCLIX LANCETS) Misc, TEST BLOOD SUGAR TWICE DAILY, Disp: 200 each, Rfl: 3    lancets Misc, Test bid, Disp: 100 each, Rfl: 3    lisinopril-hydrochlorothiazide (PRINZIDE,ZESTORETIC) 10-12.5 mg per tablet, TAKE 1 TABLET BY MOUTH EVERY DAY, Disp: 90 tablet, Rfl: 3    metFORMIN (GLUCOPHAGE) 500 MG tablet, TAKE 2 TABLETS BY MOUTH TWICE DAILY WITH MEALS, Disp: 360 tablet, Rfl: 3    Lab Results   Component Value Date    HGBA1C 6.7 (H) 06/23/2020    HGBA1C 5.8 (H) 05/07/2019    HGBA1C 6.7 (H) 01/31/2019     Lab Results   Component Value Date    MICALBCREAT Unable to calculate 02/12/2019     Lab Results   Component Value Date    LDLCALC 94 06/23/2020    LDLCALC 61.6 (L) 01/31/2019    CHOL 174 06/23/2020    HDL 59 06/23/2020    TRIG 103 06/23/2020         Past Medical History:   Diagnosis Date    Abnormal CT scan, chest 03/14/2017    At [facility], subcarinal 3 cm appears to be left lobe thyroid MRI July 2017 [facility], Dr Haynes    Abnormal Pap smear of cervix     Anxiety 10/9/2013    Lexapro works well    Colon polyp     2016, repeat  before 2021    HLD (hyperlipidemia) 10/9/2013    LDL 62 Lip 10    Hot thyroid nodule 2009    Dr Haynes    HTN (hypertension), benign 10/9/2013    Hyperlipidemia associated with type 2 diabetes mellitus 10/9/2013    Hypertension associated with diabetes 10/9/2013    Menopause     Uncontrolled type 2 diabetes mellitus with hyperglycemia, without long-term current use of insulin 3/14/2017    Vaginal Pap smear 02/25/2013    Pap/Hpv negative  [place] [facility] [place]      Past Surgical History:   Procedure Laterality Date    HYSTERECTOMY  2007    BARTOLOME/BSO for fibroids    NECK LESION BIOPSY  2017    Neck needle Bx -- Thyroid Nodule     THYROIDECTOMY, PARTIAL  2010    [facility]     Social History     Social History Narrative    Works with insurance, 4 cats, No children, nonsmoker, ETOH rarely, GYN Bone, colon polyp 2016, repeat before 2021     Family History   Problem Relation Age of Onset    Heart attack Father     Cervical cancer Mother     Diabetes Maternal Grandmother     Hyperlipidemia Brother     Obesity Brother     Colon cancer Neg Hx     Breast cancer Neg Hx      There were no vitals filed for this visit.  Objective:   Physical Exam  Assessment:     1. Controlled type 2 diabetes mellitus without complication, without long-term current use of insulin    2. Hyperlipidemia associated with type 2 diabetes mellitus    3. Hypertension associated with diabetes    4. Polyp of colon, unspecified part of colon, unspecified type      Plan:        Continue medications    She [facility] Overdue with Dr Wellington    FOR  DIABETES:  ==================================  SEE me in  6 MONTHS with  BLOODWORK one week PRIOR  ===================================    Your 1# medicine is  EXERCISE  - huffing & puffing for 20  MINUTES EVERY DAY - check your sugars & you'll see them go down    The future risks of uncontrolled diabetes - include heart attack, stroke, neuropathy (pain in hands & feet that could lead to  infection and amputation), nephropathy (leading to kidney dialysis) , and blindness     To prevent complications that can be treated early, please see your  opthalmologist EYE DOCTOR YEARLY      Always wear protective SHOES    Focus on NO SUGAR and no sugar substitutes (splenda,s tevia, etc) IN YOUR DRINKS. With respect to food - LOW CARBOHYDRATES - switch to whole wheat & brown rice.    Decrease & try to stop ALCOHOL, WHITE BREAD, WHITE PASTA, WHITE RICE , WHITE POTATOES- which all turn into sugar.    EAT an EXTRA VEGETABLE A DAY than you already do.    Consider reading the book -  End Of Diabetes by Dr Valadez    Once YEARLY I will check basic labs CBC, CMP, fasting lipids, TSH, Hga1C prior to your visit      ----------------------------          TELEMED  There are no Patient Instructions on file for this visit.

## 2020-06-30 RX ORDER — GLIPIZIDE 10 MG/1
TABLET, FILM COATED, EXTENDED RELEASE ORAL
Qty: 90 TABLET | Refills: 3 | Status: SHIPPED | OUTPATIENT
Start: 2020-06-30 | End: 2021-01-27 | Stop reason: ALTCHOICE

## 2020-07-29 RX ORDER — HYDROCHLOROTHIAZIDE 12.5 MG/1
12.5 TABLET ORAL DAILY
Qty: 90 TABLET | Refills: 3 | Status: SHIPPED | OUTPATIENT
Start: 2020-07-29 | End: 2021-07-19

## 2020-07-29 RX ORDER — LISINOPRIL 10 MG/1
10 TABLET ORAL DAILY
Qty: 90 TABLET | Refills: 3 | Status: SHIPPED | OUTPATIENT
Start: 2020-07-29 | End: 2021-07-19

## 2020-07-29 RX ORDER — LISINOPRIL AND HYDROCHLOROTHIAZIDE 10; 12.5 MG/1; MG/1
1 TABLET ORAL DAILY
Qty: 90 TABLET | Refills: 3 | Status: CANCELLED | OUTPATIENT
Start: 2020-07-29

## 2020-08-10 LAB
LEFT EYE DM RETINOPATHY: NEGATIVE
RIGHT EYE DM RETINOPATHY: NEGATIVE

## 2020-09-04 ENCOUNTER — PATIENT OUTREACH (OUTPATIENT)
Dept: ADMINISTRATIVE | Facility: HOSPITAL | Age: 55
End: 2020-09-04

## 2020-11-09 RX ORDER — ESCITALOPRAM OXALATE 20 MG/1
20 TABLET ORAL DAILY
Qty: 90 TABLET | Refills: 3 | Status: SHIPPED | OUTPATIENT
Start: 2020-11-09 | End: 2021-08-03

## 2020-11-11 ENCOUNTER — TELEPHONE (OUTPATIENT)
Dept: PRIMARY CARE CLINIC | Facility: CLINIC | Age: 55
End: 2020-11-11

## 2020-11-18 RX ORDER — ATORVASTATIN CALCIUM 10 MG/1
10 TABLET, FILM COATED ORAL DAILY
Qty: 90 TABLET | Refills: 3 | Status: SHIPPED | OUTPATIENT
Start: 2020-11-18 | End: 2022-02-16 | Stop reason: SDUPTHER

## 2020-12-07 ENCOUNTER — TELEPHONE (OUTPATIENT)
Dept: PRIMARY CARE CLINIC | Facility: CLINIC | Age: 55
End: 2020-12-07

## 2020-12-07 ENCOUNTER — PATIENT MESSAGE (OUTPATIENT)
Dept: PRIMARY CARE CLINIC | Facility: CLINIC | Age: 55
End: 2020-12-07

## 2020-12-07 NOTE — TELEPHONE ENCOUNTER
----- Message from Sabra Aguirre sent at 12/7/2020  9:26 AM CST -----  Contact: [phone]  Patient called requested a courtesy call from nurse. She would like to schedule her blood pressure check with her. Please call and advise. Thank you

## 2020-12-14 ENCOUNTER — CLINICAL SUPPORT (OUTPATIENT)
Dept: PRIMARY CARE CLINIC | Facility: CLINIC | Age: 55
End: 2020-12-14
Payer: COMMERCIAL

## 2020-12-14 ENCOUNTER — LAB VISIT (OUTPATIENT)
Dept: LAB | Facility: HOSPITAL | Age: 55
End: 2020-12-14
Attending: FAMILY MEDICINE
Payer: COMMERCIAL

## 2020-12-14 VITALS — HEART RATE: 71 BPM | DIASTOLIC BLOOD PRESSURE: 78 MMHG | SYSTOLIC BLOOD PRESSURE: 130 MMHG

## 2020-12-14 DIAGNOSIS — E11.9 CONTROLLED TYPE 2 DIABETES MELLITUS WITHOUT COMPLICATION, WITHOUT LONG-TERM CURRENT USE OF INSULIN: ICD-10-CM

## 2020-12-14 DIAGNOSIS — E78.5 HYPERLIPIDEMIA ASSOCIATED WITH TYPE 2 DIABETES MELLITUS: ICD-10-CM

## 2020-12-14 DIAGNOSIS — E11.69 HYPERLIPIDEMIA ASSOCIATED WITH TYPE 2 DIABETES MELLITUS: ICD-10-CM

## 2020-12-14 DIAGNOSIS — R63.5 WEIGHT GAIN: ICD-10-CM

## 2020-12-14 LAB
CHOLEST SERPL-MCNC: 141 MG/DL (ref 120–199)
CHOLEST/HDLC SERPL: 2.8 {RATIO} (ref 2–5)
ESTIMATED AVG GLUCOSE: 163 MG/DL (ref 68–131)
HBA1C MFR BLD HPLC: 7.3 % (ref 4–5.6)
HDLC SERPL-MCNC: 51 MG/DL (ref 40–75)
HDLC SERPL: 36.2 % (ref 20–50)
LDLC SERPL CALC-MCNC: 68.6 MG/DL (ref 63–159)
NONHDLC SERPL-MCNC: 90 MG/DL
TRIGL SERPL-MCNC: 107 MG/DL (ref 30–150)
TSH SERPL DL<=0.005 MIU/L-ACNC: 1.02 UIU/ML (ref 0.4–4)

## 2020-12-14 PROCEDURE — 84443 ASSAY THYROID STIM HORMONE: CPT

## 2020-12-14 PROCEDURE — 83036 HEMOGLOBIN GLYCOSYLATED A1C: CPT

## 2020-12-14 PROCEDURE — 80061 LIPID PANEL: CPT

## 2020-12-14 PROCEDURE — 36415 COLL VENOUS BLD VENIPUNCTURE: CPT | Mod: PN

## 2020-12-14 PROCEDURE — 99999 PR PBB SHADOW E&M-EST. PATIENT-LVL II: ICD-10-PCS | Mod: PBBFAC,,,

## 2020-12-14 PROCEDURE — 99999 PR PBB SHADOW E&M-EST. PATIENT-LVL II: CPT | Mod: PBBFAC,,,

## 2020-12-14 NOTE — PROGRESS NOTES
F/U BP check for Dr. Dickerson.  She is taking lisinopril and hydrochlorothiazide, denies HA, dizziness, N/V, high/low blood pressure.    Dr. Dickerson advised of readings via telephone encounter.  Informed patient he/she will be contacted with further advice.

## 2020-12-15 ENCOUNTER — TELEPHONE (OUTPATIENT)
Dept: PRIMARY CARE CLINIC | Facility: CLINIC | Age: 55
End: 2020-12-15

## 2020-12-15 ENCOUNTER — PATIENT MESSAGE (OUTPATIENT)
Dept: PRIMARY CARE CLINIC | Facility: CLINIC | Age: 55
End: 2020-12-15

## 2020-12-15 DIAGNOSIS — E11.65 UNCONTROLLED TYPE 2 DIABETES MELLITUS WITH HYPERGLYCEMIA: Primary | ICD-10-CM

## 2020-12-15 NOTE — PROGRESS NOTES
Hello.   Your sugars have greatly increased to 7.3% - uncontrolled diabetes. Are you taking Metformin & Glipizide?

## 2021-01-21 ENCOUNTER — TELEPHONE (OUTPATIENT)
Dept: INTERNAL MEDICINE | Facility: CLINIC | Age: 56
End: 2021-01-21

## 2021-01-26 ENCOUNTER — PATIENT MESSAGE (OUTPATIENT)
Dept: INTERNAL MEDICINE | Facility: CLINIC | Age: 56
End: 2021-01-26

## 2021-01-26 ENCOUNTER — OFFICE VISIT (OUTPATIENT)
Dept: INTERNAL MEDICINE | Facility: CLINIC | Age: 56
End: 2021-01-26
Payer: COMMERCIAL

## 2021-01-26 DIAGNOSIS — E11.69 HYPERLIPIDEMIA ASSOCIATED WITH TYPE 2 DIABETES MELLITUS: ICD-10-CM

## 2021-01-26 DIAGNOSIS — E11.65 UNCONTROLLED TYPE 2 DIABETES MELLITUS WITH HYPERGLYCEMIA: Primary | ICD-10-CM

## 2021-01-26 DIAGNOSIS — E11.59 HYPERTENSION ASSOCIATED WITH DIABETES: ICD-10-CM

## 2021-01-26 DIAGNOSIS — E78.5 HYPERLIPIDEMIA ASSOCIATED WITH TYPE 2 DIABETES MELLITUS: ICD-10-CM

## 2021-01-26 DIAGNOSIS — Z86.39 HISTORY OF THYROID NODULE: ICD-10-CM

## 2021-01-26 DIAGNOSIS — I15.2 HYPERTENSION ASSOCIATED WITH DIABETES: ICD-10-CM

## 2021-01-26 PROCEDURE — 99215 OFFICE O/P EST HI 40 MIN: CPT | Mod: 95,,, | Performed by: NURSE PRACTITIONER

## 2021-01-26 PROCEDURE — 3051F PR MOST RECENT HEMOGLOBIN A1C LEVEL 7.0 - < 8.0%: ICD-10-PCS | Mod: CPTII,,, | Performed by: NURSE PRACTITIONER

## 2021-01-26 PROCEDURE — 3051F HG A1C>EQUAL 7.0%<8.0%: CPT | Mod: CPTII,,, | Performed by: NURSE PRACTITIONER

## 2021-01-26 PROCEDURE — 99215 PR OFFICE/OUTPT VISIT, EST, LEVL V, 40-54 MIN: ICD-10-PCS | Mod: 95,,, | Performed by: NURSE PRACTITIONER

## 2021-01-27 ENCOUNTER — PATIENT MESSAGE (OUTPATIENT)
Dept: INTERNAL MEDICINE | Facility: CLINIC | Age: 56
End: 2021-01-27

## 2021-01-27 DIAGNOSIS — Z12.31 OTHER SCREENING MAMMOGRAM: ICD-10-CM

## 2021-01-27 RX ORDER — EMPAGLIFLOZIN 10 MG/1
10 TABLET, FILM COATED ORAL DAILY
Qty: 30 TABLET | Refills: 6 | Status: SHIPPED | OUTPATIENT
Start: 2021-01-27 | End: 2021-08-25

## 2021-03-08 ENCOUNTER — OFFICE VISIT (OUTPATIENT)
Dept: OBSTETRICS AND GYNECOLOGY | Facility: CLINIC | Age: 56
End: 2021-03-08
Attending: OBSTETRICS & GYNECOLOGY
Payer: COMMERCIAL

## 2021-03-08 VITALS
HEIGHT: 62 IN | BODY MASS INDEX: 38.14 KG/M2 | WEIGHT: 207.25 LBS | TEMPERATURE: 97 F | SYSTOLIC BLOOD PRESSURE: 126 MMHG | DIASTOLIC BLOOD PRESSURE: 82 MMHG

## 2021-03-08 DIAGNOSIS — Z01.419 ENCOUNTER FOR GYNECOLOGICAL EXAMINATION: Primary | ICD-10-CM

## 2021-03-08 PROCEDURE — 3008F BODY MASS INDEX DOCD: CPT | Mod: CPTII,S$GLB,, | Performed by: OBSTETRICS & GYNECOLOGY

## 2021-03-08 PROCEDURE — 3079F DIAST BP 80-89 MM HG: CPT | Mod: CPTII,S$GLB,, | Performed by: OBSTETRICS & GYNECOLOGY

## 2021-03-08 PROCEDURE — 99396 PR PREVENTIVE VISIT,EST,40-64: ICD-10-PCS | Mod: S$GLB,,, | Performed by: OBSTETRICS & GYNECOLOGY

## 2021-03-08 PROCEDURE — 99999 PR PBB SHADOW E&M-EST. PATIENT-LVL IV: ICD-10-PCS | Mod: PBBFAC,,, | Performed by: OBSTETRICS & GYNECOLOGY

## 2021-03-08 PROCEDURE — 99396 PREV VISIT EST AGE 40-64: CPT | Mod: S$GLB,,, | Performed by: OBSTETRICS & GYNECOLOGY

## 2021-03-08 PROCEDURE — 3074F PR MOST RECENT SYSTOLIC BLOOD PRESSURE < 130 MM HG: ICD-10-PCS | Mod: CPTII,S$GLB,, | Performed by: OBSTETRICS & GYNECOLOGY

## 2021-03-08 PROCEDURE — 1126F PR PAIN SEVERITY QUANTIFIED, NO PAIN PRESENT: ICD-10-PCS | Mod: S$GLB,,, | Performed by: OBSTETRICS & GYNECOLOGY

## 2021-03-08 PROCEDURE — 3074F SYST BP LT 130 MM HG: CPT | Mod: CPTII,S$GLB,, | Performed by: OBSTETRICS & GYNECOLOGY

## 2021-03-08 PROCEDURE — 1126F AMNT PAIN NOTED NONE PRSNT: CPT | Mod: S$GLB,,, | Performed by: OBSTETRICS & GYNECOLOGY

## 2021-03-08 PROCEDURE — 3008F PR BODY MASS INDEX (BMI) DOCUMENTED: ICD-10-PCS | Mod: CPTII,S$GLB,, | Performed by: OBSTETRICS & GYNECOLOGY

## 2021-03-08 PROCEDURE — 3079F PR MOST RECENT DIASTOLIC BLOOD PRESSURE 80-89 MM HG: ICD-10-PCS | Mod: CPTII,S$GLB,, | Performed by: OBSTETRICS & GYNECOLOGY

## 2021-03-08 PROCEDURE — 99999 PR PBB SHADOW E&M-EST. PATIENT-LVL IV: CPT | Mod: PBBFAC,,, | Performed by: OBSTETRICS & GYNECOLOGY

## 2021-04-27 ENCOUNTER — PATIENT MESSAGE (OUTPATIENT)
Dept: INTERNAL MEDICINE | Facility: CLINIC | Age: 56
End: 2021-04-27

## 2021-04-27 DIAGNOSIS — E11.59 HYPERTENSION ASSOCIATED WITH DIABETES: ICD-10-CM

## 2021-04-27 DIAGNOSIS — E78.5 HYPERLIPIDEMIA ASSOCIATED WITH TYPE 2 DIABETES MELLITUS: ICD-10-CM

## 2021-04-27 DIAGNOSIS — I15.2 HYPERTENSION ASSOCIATED WITH DIABETES: ICD-10-CM

## 2021-04-27 DIAGNOSIS — E11.9 CONTROLLED TYPE 2 DIABETES MELLITUS WITHOUT COMPLICATION, WITHOUT LONG-TERM CURRENT USE OF INSULIN: Primary | ICD-10-CM

## 2021-04-27 DIAGNOSIS — E11.69 HYPERLIPIDEMIA ASSOCIATED WITH TYPE 2 DIABETES MELLITUS: ICD-10-CM

## 2021-04-27 LAB
ALBUMIN SERPL-MCNC: 4.5 G/DL (ref 3.6–5.1)
ALBUMIN/GLOB SERPL: 1.9 (CALC) (ref 1–2.5)
ALP SERPL-CCNC: 49 U/L (ref 37–153)
ALT SERPL-CCNC: 14 U/L (ref 6–29)
AST SERPL-CCNC: 12 U/L (ref 10–35)
BILIRUB SERPL-MCNC: 0.4 MG/DL (ref 0.2–1.2)
BUN SERPL-MCNC: 16 MG/DL (ref 7–25)
BUN/CREAT SERPL: ABNORMAL (CALC) (ref 6–22)
CALCIUM SERPL-MCNC: 9.4 MG/DL (ref 8.6–10.4)
CHLORIDE SERPL-SCNC: 102 MMOL/L (ref 98–110)
CHOLEST SERPL-MCNC: 174 MG/DL
CHOLEST/HDLC SERPL: 2.7 (CALC)
CO2 SERPL-SCNC: 24 MMOL/L (ref 20–32)
CREAT SERPL-MCNC: 0.58 MG/DL (ref 0.5–1.05)
GLOBULIN SER CALC-MCNC: 2.4 G/DL (CALC) (ref 1.9–3.7)
GLUCOSE SERPL-MCNC: 166 MG/DL (ref 65–99)
HBA1C MFR BLD: 6.8 % OF TOTAL HGB
HDLC SERPL-MCNC: 64 MG/DL
LDLC SERPL CALC-MCNC: 89 MG/DL (CALC)
NONHDLC SERPL-MCNC: 110 MG/DL (CALC)
POTASSIUM SERPL-SCNC: 4.7 MMOL/L (ref 3.5–5.3)
PROT SERPL-MCNC: 6.9 G/DL (ref 6.1–8.1)
SODIUM SERPL-SCNC: 137 MMOL/L (ref 135–146)
TRIGL SERPL-MCNC: 117 MG/DL

## 2021-07-19 DIAGNOSIS — E11.65 UNCONTROLLED TYPE 2 DIABETES MELLITUS WITH HYPERGLYCEMIA, WITHOUT LONG-TERM CURRENT USE OF INSULIN: ICD-10-CM

## 2021-07-19 RX ORDER — LISINOPRIL AND HYDROCHLOROTHIAZIDE 10; 12.5 MG/1; MG/1
TABLET ORAL
Qty: 90 TABLET | Refills: 3 | Status: SHIPPED | OUTPATIENT
Start: 2021-07-19 | End: 2022-02-16 | Stop reason: SDUPTHER

## 2021-07-19 RX ORDER — METFORMIN HYDROCHLORIDE 500 MG/1
TABLET ORAL
Qty: 360 TABLET | Refills: 3 | Status: SHIPPED | OUTPATIENT
Start: 2021-07-19 | End: 2022-02-16 | Stop reason: SDUPTHER

## 2021-08-03 RX ORDER — ESCITALOPRAM OXALATE 20 MG/1
TABLET ORAL
Qty: 90 TABLET | Refills: 1 | Status: SHIPPED | OUTPATIENT
Start: 2021-08-03 | End: 2022-05-11

## 2021-08-20 RX ORDER — DAPAGLIFLOZIN 10 MG/1
TABLET, FILM COATED ORAL
Qty: 30 TABLET | Refills: 6 | Status: SHIPPED | OUTPATIENT
Start: 2021-08-20 | End: 2022-02-16 | Stop reason: SDUPTHER

## 2022-01-11 NOTE — TELEPHONE ENCOUNTER
Care Due:                  Date            Visit Type   Department     Provider  --------------------------------------------------------------------------------                                             [facility]  Last Visit: 06-      None         CARE           Nicole Dickerson  Next Visit: None Scheduled  None         None Found                                                            Last  Test          Frequency    Reason                     Performed    Due Date  --------------------------------------------------------------------------------    Office Visit  12 months..  EScitalopram,              06- 06-                             atorvastatin,                             lisinopriL-hydrochlorothi                             azide, metFORMIN.........    HBA1C.......  6 months...  metFORMIN................  04-   10-    Powered by [employer]. Reference number: [id number].   1/11/2022 8:09:06 AM CST

## 2022-01-12 RX ORDER — ATORVASTATIN CALCIUM 10 MG/1
TABLET, FILM COATED ORAL
Qty: 90 TABLET | Refills: 3 | OUTPATIENT
Start: 2022-01-12

## 2022-01-12 NOTE — TELEPHONE ENCOUNTER
Pt advised she need a f/u appt for diabetes testing   Pt state she is sick right now Upper respiratory symptoms, she will call back to schedule the appt once she is feeling better

## 2022-01-12 NOTE — TELEPHONE ENCOUNTER
Diabetic testing overdue, please schedule with me or  Amanda Stovall for DM with Hga1c, all labs, urine microalb 1 week prior

## 2022-02-09 ENCOUNTER — TELEPHONE (OUTPATIENT)
Dept: INTERNAL MEDICINE | Facility: CLINIC | Age: 57
End: 2022-02-09
Payer: COMMERCIAL

## 2022-02-09 DIAGNOSIS — E11.9 CONTROLLED TYPE 2 DIABETES MELLITUS WITHOUT COMPLICATION, WITHOUT LONG-TERM CURRENT USE OF INSULIN: Primary | ICD-10-CM

## 2022-02-09 DIAGNOSIS — I15.2 HYPERTENSION ASSOCIATED WITH DIABETES: ICD-10-CM

## 2022-02-09 DIAGNOSIS — E11.69 HYPERLIPIDEMIA ASSOCIATED WITH TYPE 2 DIABETES MELLITUS: ICD-10-CM

## 2022-02-09 DIAGNOSIS — E78.5 HYPERLIPIDEMIA ASSOCIATED WITH TYPE 2 DIABETES MELLITUS: ICD-10-CM

## 2022-02-09 DIAGNOSIS — E11.59 HYPERTENSION ASSOCIATED WITH DIABETES: ICD-10-CM

## 2022-02-09 NOTE — TELEPHONE ENCOUNTER
----- Message from Jodi Anthony sent at 2/9/2022 12:42 PM CST -----  Contact: DAREK GUZMAN [[id number]] @ [phone]  Patient want to have labs done prior to seeing you but she also need an appointment with you. Send the order to [facility] in [place].

## 2022-02-11 LAB
ALBUMIN SERPL-MCNC: 4.8 G/DL (ref 3.6–5.1)
ALBUMIN/GLOB SERPL: 2 (CALC) (ref 1–2.5)
ALP SERPL-CCNC: 47 U/L (ref 37–153)
ALT SERPL-CCNC: 14 U/L (ref 6–29)
AST SERPL-CCNC: 12 U/L (ref 10–35)
BILIRUB SERPL-MCNC: 0.4 MG/DL (ref 0.2–1.2)
BUN SERPL-MCNC: 11 MG/DL (ref 7–25)
BUN/CREAT SERPL: ABNORMAL (CALC) (ref 6–22)
CALCIUM SERPL-MCNC: 9.3 MG/DL (ref 8.6–10.4)
CHLORIDE SERPL-SCNC: 103 MMOL/L (ref 98–110)
CHOLEST SERPL-MCNC: 166 MG/DL
CHOLEST/HDLC SERPL: 2.7 (CALC)
CO2 SERPL-SCNC: 27 MMOL/L (ref 20–32)
CREAT SERPL-MCNC: 0.51 MG/DL (ref 0.5–1.05)
GLOBULIN SER CALC-MCNC: 2.4 G/DL (CALC) (ref 1.9–3.7)
GLUCOSE SERPL-MCNC: 119 MG/DL (ref 65–99)
HBA1C MFR BLD: 6.3 % OF TOTAL HGB
HDLC SERPL-MCNC: 62 MG/DL
LDLC SERPL CALC-MCNC: 84 MG/DL (CALC)
NONHDLC SERPL-MCNC: 104 MG/DL (CALC)
POTASSIUM SERPL-SCNC: 4.2 MMOL/L (ref 3.5–5.3)
PROT SERPL-MCNC: 7.2 G/DL (ref 6.1–8.1)
SODIUM SERPL-SCNC: 139 MMOL/L (ref 135–146)
T4 FREE SERPL-MCNC: 1.2 NG/DL (ref 0.8–1.8)
TRIGL SERPL-MCNC: 104 MG/DL
TSH SERPL-ACNC: 1.99 MIU/L (ref 0.4–4.5)

## 2022-02-14 ENCOUNTER — PATIENT MESSAGE (OUTPATIENT)
Dept: PRIMARY CARE CLINIC | Facility: CLINIC | Age: 57
End: 2022-02-14
Payer: COMMERCIAL

## 2022-02-16 ENCOUNTER — OFFICE VISIT (OUTPATIENT)
Dept: INTERNAL MEDICINE | Facility: CLINIC | Age: 57
End: 2022-02-16
Payer: COMMERCIAL

## 2022-02-16 VITALS
BODY MASS INDEX: 33.31 KG/M2 | TEMPERATURE: 98 F | WEIGHT: 181 LBS | DIASTOLIC BLOOD PRESSURE: 60 MMHG | OXYGEN SATURATION: 96 % | HEIGHT: 62 IN | HEART RATE: 90 BPM | SYSTOLIC BLOOD PRESSURE: 108 MMHG | RESPIRATION RATE: 18 BRPM

## 2022-02-16 DIAGNOSIS — I15.2 HYPERTENSION ASSOCIATED WITH DIABETES: Primary | ICD-10-CM

## 2022-02-16 DIAGNOSIS — E11.9 CONTROLLED TYPE 2 DIABETES MELLITUS WITHOUT COMPLICATION, WITHOUT LONG-TERM CURRENT USE OF INSULIN: ICD-10-CM

## 2022-02-16 DIAGNOSIS — E78.5 HYPERLIPIDEMIA ASSOCIATED WITH TYPE 2 DIABETES MELLITUS: ICD-10-CM

## 2022-02-16 DIAGNOSIS — E11.59 HYPERTENSION ASSOCIATED WITH DIABETES: Primary | ICD-10-CM

## 2022-02-16 DIAGNOSIS — E11.69 HYPERLIPIDEMIA ASSOCIATED WITH TYPE 2 DIABETES MELLITUS: ICD-10-CM

## 2022-02-16 DIAGNOSIS — E11.65 UNCONTROLLED TYPE 2 DIABETES MELLITUS WITH HYPERGLYCEMIA, WITHOUT LONG-TERM CURRENT USE OF INSULIN: ICD-10-CM

## 2022-02-16 PROCEDURE — 4010F PR ACE/ARB THEARPY RXD/TAKEN: ICD-10-PCS | Mod: CPTII,S$GLB,, | Performed by: NURSE PRACTITIONER

## 2022-02-16 PROCEDURE — 99999 PR PBB SHADOW E&M-EST. PATIENT-LVL IV: CPT | Mod: PBBFAC,,, | Performed by: NURSE PRACTITIONER

## 2022-02-16 PROCEDURE — 4010F ACE/ARB THERAPY RXD/TAKEN: CPT | Mod: CPTII,S$GLB,, | Performed by: NURSE PRACTITIONER

## 2022-02-16 PROCEDURE — 3074F SYST BP LT 130 MM HG: CPT | Mod: CPTII,S$GLB,, | Performed by: NURSE PRACTITIONER

## 2022-02-16 PROCEDURE — 99214 OFFICE O/P EST MOD 30 MIN: CPT | Mod: S$GLB,,, | Performed by: NURSE PRACTITIONER

## 2022-02-16 PROCEDURE — 3008F PR BODY MASS INDEX (BMI) DOCUMENTED: ICD-10-PCS | Mod: CPTII,S$GLB,, | Performed by: NURSE PRACTITIONER

## 2022-02-16 PROCEDURE — 3008F BODY MASS INDEX DOCD: CPT | Mod: CPTII,S$GLB,, | Performed by: NURSE PRACTITIONER

## 2022-02-16 PROCEDURE — 3074F PR MOST RECENT SYSTOLIC BLOOD PRESSURE < 130 MM HG: ICD-10-PCS | Mod: CPTII,S$GLB,, | Performed by: NURSE PRACTITIONER

## 2022-02-16 PROCEDURE — 3078F PR MOST RECENT DIASTOLIC BLOOD PRESSURE < 80 MM HG: ICD-10-PCS | Mod: CPTII,S$GLB,, | Performed by: NURSE PRACTITIONER

## 2022-02-16 PROCEDURE — 1160F PR REVIEW ALL MEDS BY PRESCRIBER/CLIN PHARMACIST DOCUMENTED: ICD-10-PCS | Mod: CPTII,S$GLB,, | Performed by: NURSE PRACTITIONER

## 2022-02-16 PROCEDURE — 3044F PR MOST RECENT HEMOGLOBIN A1C LEVEL <7.0%: ICD-10-PCS | Mod: CPTII,S$GLB,, | Performed by: NURSE PRACTITIONER

## 2022-02-16 PROCEDURE — 1159F MED LIST DOCD IN RCRD: CPT | Mod: CPTII,S$GLB,, | Performed by: NURSE PRACTITIONER

## 2022-02-16 PROCEDURE — 99214 PR OFFICE/OUTPT VISIT, EST, LEVL IV, 30-39 MIN: ICD-10-PCS | Mod: S$GLB,,, | Performed by: NURSE PRACTITIONER

## 2022-02-16 PROCEDURE — 1160F RVW MEDS BY RX/DR IN RCRD: CPT | Mod: CPTII,S$GLB,, | Performed by: NURSE PRACTITIONER

## 2022-02-16 PROCEDURE — 3044F HG A1C LEVEL LT 7.0%: CPT | Mod: CPTII,S$GLB,, | Performed by: NURSE PRACTITIONER

## 2022-02-16 PROCEDURE — 1159F PR MEDICATION LIST DOCUMENTED IN MEDICAL RECORD: ICD-10-PCS | Mod: CPTII,S$GLB,, | Performed by: NURSE PRACTITIONER

## 2022-02-16 PROCEDURE — 3078F DIAST BP <80 MM HG: CPT | Mod: CPTII,S$GLB,, | Performed by: NURSE PRACTITIONER

## 2022-02-16 PROCEDURE — 99999 PR PBB SHADOW E&M-EST. PATIENT-LVL IV: ICD-10-PCS | Mod: PBBFAC,,, | Performed by: NURSE PRACTITIONER

## 2022-02-16 RX ORDER — METFORMIN HYDROCHLORIDE 500 MG/1
1000 TABLET ORAL 2 TIMES DAILY WITH MEALS
Qty: 360 TABLET | Refills: 3 | Status: SHIPPED | OUTPATIENT
Start: 2022-02-16 | End: 2023-04-10

## 2022-02-16 RX ORDER — ATORVASTATIN CALCIUM 10 MG/1
10 TABLET, FILM COATED ORAL NIGHTLY
Qty: 90 TABLET | Refills: 3 | Status: SHIPPED | OUTPATIENT
Start: 2022-02-16 | End: 2022-02-16 | Stop reason: SDUPTHER

## 2022-02-16 RX ORDER — LISINOPRIL AND HYDROCHLOROTHIAZIDE 10; 12.5 MG/1; MG/1
1 TABLET ORAL DAILY
Qty: 90 TABLET | Refills: 3 | Status: SHIPPED | OUTPATIENT
Start: 2022-02-16 | End: 2022-02-16 | Stop reason: SDUPTHER

## 2022-02-16 RX ORDER — LISINOPRIL AND HYDROCHLOROTHIAZIDE 10; 12.5 MG/1; MG/1
1 TABLET ORAL DAILY
Qty: 90 TABLET | Refills: 3 | Status: SHIPPED | OUTPATIENT
Start: 2022-02-16 | End: 2022-08-24

## 2022-02-16 RX ORDER — DAPAGLIFLOZIN 10 MG/1
TABLET, FILM COATED ORAL
Qty: 90 TABLET | Refills: 3 | Status: SHIPPED | OUTPATIENT
Start: 2022-02-16 | End: 2022-08-24 | Stop reason: SDUPTHER

## 2022-02-16 RX ORDER — METFORMIN HYDROCHLORIDE 500 MG/1
1000 TABLET ORAL 2 TIMES DAILY WITH MEALS
Qty: 360 TABLET | Refills: 3 | Status: SHIPPED | OUTPATIENT
Start: 2022-02-16 | End: 2022-02-16 | Stop reason: SDUPTHER

## 2022-02-16 RX ORDER — ATORVASTATIN CALCIUM 10 MG/1
10 TABLET, FILM COATED ORAL NIGHTLY
Qty: 90 TABLET | Refills: 3 | Status: SHIPPED | OUTPATIENT
Start: 2022-02-16 | End: 2023-04-10

## 2022-02-16 NOTE — PATIENT INSTRUCTIONS
"Continue metformin twice daily.   Continue farxiga 10mg tablet daily.   Continue diabetic diet.   Exercise as tolerated.   Stay hydrated, use good hygiene.     Ideal glucose for you before a meal should be 80 - 120 -   You can check daily in the morning to trend your ranges/see how you are doing.       Low Carb Snacks & Diabetes friendly foods   Aim for 30 - 40 grams of carbs for 3 meals per day (or 2 meals and  1 - 2 snacks - however you prefer)  Snacks can be 15 - 20 grams of carbs.     Eating small, frequent meals will help you to feel more satisfied, and more full so that you don't over eat or eat the wrong foods later.   Also, shade meals/snacks allow you to spread carbohydrates evenly, which may stabilize blood sugars.   Below you will find a list of ideas of foods that I like/prefer.   Feel free to give me your ideas and tips if you find good ones too!   Overall - please remember to limit refined sugars such as soft drinks, juices, rices, pastas, breads, cakes.   Look at the back of the label - look at the amount of "carbohydrates", then look at the amount of "fiber" - subtract the amount of fiber from the amount of carbs - this is your "net carb intake".  The more fiber a food has, the better generally, as you get to subtract this from the net carbs.     0-5 gm carb   Crystal Light flavoring (I like fruit punch flavor!)   Vitamin Water Zero   Lauren antioxidant drinks.    Sparkling ice (long skinny flavored water bottles for $1 that are sugar free).    Timur water, WaterLoo - carbonated flavored landin, various flavors.   Diet coke, diet barqs, sprite zero.   Diet sunkist (orange drink)   Sugar free powerade   Herbal tea, unsweetened   2 tsp peanut butter on celery or carrots   Dalia's original Candies - (the Sugar Free ones!)   1/2 cup sugar-free jell-o   1 sugar-free popsicle   ¼ cup blueberries or strawberries   8oz Blue Cinda unsweetened almond milk (or there is sugar free vanilla " "flavored as well).   5 baby carrots & celery sticks, cucumbers, bell peppers dipped in ¼ cup salsa, 2Tbsp light ranch dressing or 2Tbsp plain Greek yogurt   10 Goldfish crackers   ½ oz low-fat cheese or string cheese   1 closed handful of nuts, unsalted (example-->almonds, pistachios, cashews, peanuts, etc).   1 Tbsp of sunflower seeds, unsalted   1 cup Smart Pop popcorn   1 whole grain brown rice cake    "Think Thin" protein bars - my personal favorite is Creamy Peanut butter, chocolate brownie, or oreo flavors.   "Og" bars  - can be found at [employer] grocery store - they have 5 grams of net carbohydrates.   Quest bars (my favorite is birthday cake, and also cinnamon roll is good melted for 10 seconds in the microwave - please remove the wrapper first!)   Premier protein shakes - sold at [employer], or other brand alternatives - usually 1 - 2 grams of carbs (strawberry, vanilla, chocolate flavors) Coffee flavor is my new morning favorite!    Scrambled eggs! Or a fried egg or boiled eggs - add sliced tomatoes, cilantro or some chopped green onions.    Eggs are good with any and all veggies! You can even eat them for dinner or in a low carb tortilla, or served with your favorite grilled meat/sausage or colón is my favorite.    Check out pre-made egg scrambles in the egg grocery store section - Ore Rach "just crack an egg" is premixed cheese, colnó and small amount of pototes, small cup size portions for your breakfast - very convenient!    Veggie spirals - zucchini - can buy in the frozen foods section. Birds eye brand is usually cheap. Tip - saute with oil/onions or italian seasoning and use this as a pasta substitution.   Milk - is usually high in carbs/sugar - use MobileTag milk brand instead - it is "filtered" milk - with half the amount of carbs of regular milk. (next to the milk in milk aisle).    Smuckers sugar free Breakfast syrup (or 1 tablespoon of low sugar breakfast syrup) instead of " "regular syrup.        15 gm carb   1 small piece of fruit or ½ banana or 1/2 cup lite canned fruit   3 danielle cracker squares   3 cups Smart Pop popcorn, top spray butter, Savage lite salt or cinnamon and Truvia   5 Vanilla Wafers   ½ cup low fat, no added sugar ice cream or frozen yogurt (Blue bell, Blue Bunny, Weight Watchers, Skinny Cow)   1/2 - 1 cup Light n' fit Vanilla yogurt (has added protein in it to make you feel full).    ½ turkey, ham, or chicken sandwich   ½ c fruit with ½ c Cottage cheese   4-6 unsalted wheat crackers with 1 oz low fat cheese or 1 tbsp peanut butter    30-45 goldfish crackers (depending on flavor)    7-8 Baptism mini brown rice cakes (caramel, apple cinnamon, chocolate)    12 Baptism mini brown rice cakes (cheddar, bbq, ranch)    1/3 cup hummus dip with raw veg   1/2 whole wheat alan, 1Tbsp hummus   Mini Pizza (1/2 whole wheat English muffin, low-fat  cheese, tomato sauce)   100 calorie snack pack (Oreo, Chips Ahoy, Ritz Mix, Baked Cheetos)   4-6 oz. light or Greek Style yogurt (Chobani, Yoplait, Okios, [place])   ½ cup sugar-free pudding     6 in. wheat tortilla or alan oven toasted chips (topped with spray butter flavoring, cinnamon, Truvia OR spray butter, garlic powder, chili powder)    18 BBQ Popchips (available at [employer], [employer], [employer])   Mini bagel (small size) toasted - add fat free cream cheese or avocado and sliced tomato on top - yum!    1/2 cup Halo top icecream - birthday cake is my go-to flavor.    Truth Bars - can be found at Fresh market - Net carbs is 11 - 12 grams depending on the flavor.    Kind bars = mostly nuts and dried berries - find at most local groceries stores, drug stores, whole foods, fresh market. Net carbs is around 10 grams.     Smoothie Lamberto - I'm often asked "what smoothie is healthy for me". Get the 20 oz. Size.   Do not be decieved to think that all smoothies are "healthy". In fact, most are loaded with hidden " "sugars.   Here are some from the menu that you are allowed to have being diabetic:   The gladiator - any flavor. This is the lowest in carbs (3 - 5 grams only)  Keto champ (berry, chocolate or coffee - all have 14 - 19 grams of carbs in 20 oz size).   The Lean 1 smoothies - vanilla, strawberry and chocolate have under 30 grams of carbs, so this is slightly more. But would be ok for a meal substitute or snack.   The Shredder in Vanilla or chocolate only.  17 grams of carbs - (the strawberry has more sugar considerably)    Tips and Tricks:     Eat an extra vegetable once per day - green veggies (such as broccoli, cauliflower, green beans) - make you feel full and are low in sugar.     My favorite "secret" seasoning to make things extra yummy is cinnamon for sweet taste   For an added secret "salty" taste - add "everything but the bagel" seasoning (you can get on amazon.com or at  joes. I have seen at local groceries such as [employer] too!).     Dark colored fruits are your friend -- blueberries, strawberries, raspberries, blackberries. They have a lower sugar content. So will be the best choice for you.   Light colored fruits are NOT your friend - they tend to be higher in sugar and are not the best options for an ADA diet - examples are oranges, bananas, peaches, watermelon, -- you can eat these, but carefully and in moderation.     WATER. I cannot emphasize drinking water enough - it will hydrate you, make you full. Your body needs it - it's a natural appetite suppressant.   Sometimes hunger and thirst can feel the same. Try drinking some water first, then eat if you are still hungry.     Other snack choices    Celery with peanut butter   Celery with tuna salad   Dill pickles and cheddar cheese (no kidding, it's a great combo)   Nuts (keep raw ones in the freezer if you think you'll overeat them)   Sunflower seeds (get them in the shell so it will take longer to eat them)   Other seeds (How to Toast Pumpkin " "or Squash Seeds)    Pistachios or almonds - will fill you up and are tasty!    Low-Carb Trail Mix   Jerky (beef or turkey -- try to find low-sugar varieties)   Salami slices (you can find in the deli section)   Cheese sticks, such as string cheese   Sugar-free Jello, alone or with cottage cheese and a sprinkling of nuts. Make sugar-free lime Jello with part coconut milk -- For a large package, dissolve the powder in a cup of boiling water, add a can of coconut milk, and then add the rest of the water. Stir well.   Pepperoni "chips" -- Zap the slices in the microwave   Cheese with a few apple slices   4-ounce plain or sugar-free yogurt with berries and flax seed meal   Smoked salmon and cream cheese on cucumber slices   Lettuce Roll-ups -- Roll luncheon meat, egg salad, tuna or other filling and veggies in lettuce leaves   Lunch Meat Roll-ups -- Roll cheese or veggies in lunch meat (read the labels for carbs on the lunch meat)   Spread bean dip, spinach dip, or other low-carb dip or spread on the lunch meat or lettuce and then roll it up   Raw veggies and spinach dip, or other low-carb dip   Pork rinds (Chicharrón), with or without dip   Ricotta cheese with fruit and/or nuts and/or flax seed meal   Mushrooms with cheese spread inside (or other spreads or dips)   Low-carb snack bars (watch out for sugar alcohols, especially maltitol)   Product Review: Atkins Advantage Bars   Pepperoni Chips -- Microwave pepperoni slices until crisp. Great with cheeses and dips   Garlic Parmesan Flax Seed Crackers   Parmesan Crisps -- Good when you want a crunchy snack.   Peanut Butter Protein Balls      "

## 2022-02-16 NOTE — PROGRESS NOTES
56 y.o. female, here for 1 year check up for management of T2dm -   Last seen for initial consult in January 2021 - was a virtual visit - those notes below -     a1c reduced from 7.3% to 6.3%.   She has lost weight - back to normal pants size.   She had gained weight at onset of pandemic, now much improved.   Thinks she was around 220 lbs and is now 181 lbs today.   Now is eating healthier -   Exercising some now - yard work and walking.   Working from home now but is fully acclimated to this/enjoys.     History of partial thyroidectomy - has never needed thyroid hormone. Usually follows with ENT regularly.   BP controlled - on lisinopril/hctz.   Cholesterol at goal - on lipitor every Hs.   For her diabetes:   She continues on metformin 1000mg twice per day.   Last year after our initial consult, I switched her from glipizide to farxiga daily.   She began and helped, increased dose from 5 to 10mg - she is taking.   Tolerating well and no side effects.   No acute complaints today's visit.       Last visit notes from January 2021: (audiovisual visit).   HPI: Yamileth Wong is a 56 y.o.  female c/I for visit to address Diabetes Type 2  This is the first time I am seeing her for care, follows with Dr. Nicole Dickerson MD for primary care needs.   Has not seen endocrinology or diabetes specialist in the past.     was diagnosed with T2DM about 25 years ago.   Brother also has T2dm, sister also had Diabetes borderline - and now passed away.   Has never been hospitalized r/t DM.  Denies missing doses of DM medication.   She has been on metformin 1000mg twice per day   And taking glipizide 10mg daily.     a1c has slowly gone up -   Last year was @ 5.8% and is now up to currently 7.3%.   Reports has struggled on and off with her weight - weight gain and weight loss.   Recently 25 lbs weight gain this year  - relates mostly to change in lifestyle and covid pandemic.   Has now been working at home.     Complications from  diabetes -   Denies nephropathy.   Denies Diabetic retinopathy.   + history of HLD and HTN.   Denies CV disease.     No acute complaints today's visit.   She is interested in tips and other medications that could hopefully manage her diabetes better than she is doing now.   She is motivated to start eating better and working on eliminating some carbs from her diet.     Past medical History:   Past Medical History:   Diagnosis Date    Abnormal CT scan, chest 03/14/2017    [facility], subcarinal 3 cm appears to be left lobe thyroid MRI July 2017 [facility], Dr Haynes    Abnormal Pap smear of cervix     Anxiety 10/9/2013    Lexapro works well    Colon polyp     2016, repeat before 2021    Hot thyroid nodule 2009    Dr Haynes    Hyperlipidemia associated with type 2 diabetes mellitus 10/9/2013    Hypertension associated with diabetes 10/9/2013    Menopause     Uncontrolled type 2 diabetes mellitus with hyperglycemia, without long-term current use of insulin 03/14/2017    [facility] q [facility]    Vaginal Pap smear 02/25/2013    [place] [facility] [place]       Family hx:   Family History   Problem Relation Age of Onset    Heart attack Father     Cervical cancer Mother     Diabetes Maternal Grandmother     Hyperlipidemia Brother     Obesity Brother     Colon cancer Neg Hx     Breast cancer Neg Hx       Current meds:   Current Outpatient Medications:     atorvastatin (LIPITOR) 10 MG tablet, Take 1 tablet (10 mg total) by mouth once daily., Disp: 90 tablet, Rfl: 3    blood sugar diagnostic (ACCU-CHEK FAITH PLUS TEST STRP) Strp, TEST BLOOD SUGAR TWICE DAILY, Disp: 200 strip, Rfl: 3    blood sugar diagnostic (BLOOD GLUCOSE TEST) Strp, 1 strip by Misc.(Non-Drug; Combo Route) route 2 (two) times daily., Disp: 100 strip, Rfl: 12    blood-glucose meter kit, Use as instructed, Disp: 1 each, Rfl: 0    blood-glucose meter kit, Use as instructed, Disp: 1 each, Rfl: 0    dapagliflozin  (FARXIGA) 10 mg tablet, Take one tablet by mouth daily, Disp: 30 tablet, Rfl: 6    EScitalopram oxalate (LEXAPRO) 20 MG tablet, TAKE 1 TABLET(20 MG) BY MOUTH EVERY DAY, Disp: 90 tablet, Rfl: 1    lancets (ACCU-CHEK SOFTCLIX LANCETS) Misc, TEST BLOOD SUGAR TWICE DAILY, Disp: 200 each, Rfl: 3    lancets Misc, Test bid, Disp: 100 each, Rfl: 3    lisinopriL-hydrochlorothiazide (PRINZIDE,ZESTORETIC) 10-12.5 mg per tablet, TAKE 1 TABLET BY MOUTH EVERY DAY, Disp: 90 tablet, Rfl: 3    metFORMIN (GLUCOPHAGE) 500 MG tablet, TAKE 2 TABLETS BY MOUTH TWICE DAILY WITH MEALS, Disp: 360 tablet, Rfl: 3    antipyrine-benzocaine (AURALGAN OR EQUIV) 5.4-1.4 % Drop, Place 3 drops into both ears 2 (two) times daily as needed. , Disp: , Rfl: 0     Current Diabetes medications:   Metformin 500mg - 2 tablets po bid.   farxiga 10mg tablet daily.     Medications Tried and Failed:   Glipizide 10mg tablet daily.     Review of Pertinent co-morbidities/risk factors:   CV: Denies history of MI nor stroke.   CAD: Denies.  Does not take aspirin daily.   BP: has history of HTN  Statin: Taking  ACE/ARB: Taking    Social History     Tobacco Use   Smoking Status Never Smoker   Smokeless Tobacco Never Used        Social:   Lives at home with long term male partner  Life changes/stressors currently: working from home/change in lifestyle - less social contact.   Diet: following ADA diet   Meals: 3 per day and small amount of snacks only.        Breakfast - protein bar - something quick       Lunch - turkey sandiwch.        Dinner - beans and rice, roast, green beans and carrots and turkey sausage       Snacks - none        Drinks -coffee and water. Diet cranberry juice.diet snapple.  Exercise: none.   Activities: working full time from home - working long hours. 12 hour days.     Glucose Monitoring:   Does not check sugars.     Standards of care:   Eyes: .: 08/10/2020  Foot exam: : 01/26/2021   Diabetes education: None.    Vital Signs  /60 (BP  "Location: Right arm, Patient Position: Sitting, BP Method: Large (Manual))   Pulse 90   Temp 97.5 °F (36.4 °C) (Temporal)   Resp 18   Ht 5' 2" (1.575 m)   Wt 82.1 kg (181 lb)   LMP  (LMP Unknown) Comment: BARTOLOME/BSO  2007  SpO2 96%   BMI 33.10 kg/m²     Pertinent Labs:   Hgba1c   Lab Results   Component Value Date    HGBA1C 6.3 (H) 02/10/2022    HGBA1C 6.8 (H) 04/26/2021    HGBA1C 7.3 (H) 12/14/2020     Lipid panel   Lab Results   Component Value Date    CHOL 166 02/10/2022    CHOL 174 04/26/2021    CHOL 141 12/14/2020     Lab Results   Component Value Date    HDL 62 02/10/2022    HDL 64 04/26/2021    HDL 51 12/14/2020     Lab Results   Component Value Date    LDLCALC 84 02/10/2022    LDLCALC 89 04/26/2021    LDLCALC 68.6 12/14/2020     Lab Results   Component Value Date    TRIG 104 02/10/2022    TRIG 117 04/26/2021    TRIG 107 12/14/2020     Lab Results   Component Value Date    CHOLHDL 2.7 02/10/2022    CHOLHDL 2.7 04/26/2021    CHOLHDL 36.2 12/14/2020      CMP  Glucose   Date Value Ref Range Status   02/10/2022 119 (H) 65 - 99 mg/dL Final     Comment:                   Fasting reference interval     For someone without known diabetes, a glucose value  between 100 and 125 mg/dL is consistent with  prediabetes and should be confirmed with a  follow-up test.          BUN   Date Value Ref Range Status   02/10/2022 11 7 - 25 mg/dL Final     Creatinine   Date Value Ref Range Status   02/10/2022 0.51 0.50 - 1.05 mg/dL Final     Comment:     For patients >49 years of age, the reference limit  for Creatinine is approximately 13% higher for people  identified as -American.          eGFR if    Date Value Ref Range Status   02/10/2022 123 > OR = 60 mL/min/1.73m2 Final     eGFR if non    Date Value Ref Range Status   02/10/2022 106 > OR = 60 mL/min/1.73m2 Final     AST   Date Value Ref Range Status   02/10/2022 12 10 - 35 U/L Final     ALT   Date Value Ref Range Status   02/10/2022 " 14 6 - 29 U/L Final     Microalbumin creatinine ratio:   Lab Results   Component Value Date    ANDREWBCREAT Unable to calculate 02/12/2019       Review Of Systems:   Gen: Appetite good, 25 lbs weight loss, mild fatigue. Denies polydipsia.  Skin: Skin is intact and heals well, denies any rashes or hair changes.   EENT: Denies any acute visual disturbances, nor blurred vision.   History of partial thyroidectomy and some thyroid nodules.   Resp: Denies SOB or Dyspnea on exertion, denies cough.   Cardiac: Denies chest pain, palpitations, or swelling.   GI: Denies abdominal pain, nausea or vomiting, diarrhea, or constipation.   /GYN: Denies nocturia, nor burning, frequency or pain on urination.  MS/Neuro: Denies numbness/ tingling in BLE; Gait steady, speech clear  Psych: Denies drug/ETOH abuse, hx depression but no acute flares controlled.   Other systems: negative.    Physical Exam  Constitutional:       Appearance: Normal appearance.   HENT:      Head: Normocephalic.      Nose: Nose normal.      Mouth/Throat:      Mouth: Mucous membranes are dry.   Eyes:      Extraocular Movements: Extraocular movements intact.      Pupils: Pupils are equal, round, and reactive to light.   Cardiovascular:      Rate and Rhythm: Normal rate.   Pulmonary:      Effort: Pulmonary effort is normal.      Breath sounds: Normal breath sounds.   Abdominal:      General: Abdomen is flat.      Palpations: Abdomen is soft.   Musculoskeletal:         General: Normal range of motion.      Cervical back: Normal range of motion.   Skin:     General: Skin is warm and dry.      Capillary Refill: Capillary refill takes less than 2 seconds.   Neurological:      General: No focal deficit present.      Mental Status: She is alert and oriented to person, place, and time.   Psychiatric:         Mood and Affect: Mood normal.         Behavior: Behavior normal.         Thought Content: Thought content normal.         Judgment: Judgment normal.       Assessment  and Plan of Care:     Yamileth was seen today for diabetes.    Diagnoses and all orders for this visit:    Controlled type 2 diabetes mellitus without complication, without long-term current use of insulin  -     Hemoglobin A1C; Future    Hypertension associated with diabetes  -     Microalbumin/Creatinine Ratio, Urine; Future  -     Comprehensive Metabolic Panel; Future    Hyperlipidemia associated with type 2 diabetes mellitus  -     Lipid Panel; Future     1. T2DM with hyperglycemia- Hgba1c 5.8%---> 6.8%---> 7.3%- 6.3% current.    discussed DM, progression of disease, long term complications, CV risk factors and tx options.   She would like to consider SGLT2i or GLP1 - and will notify me via my chart sunshine/patient portal of her decision.   If we begin one of those medications, I will stop her glipizide.   For now, continue metformin 1000 mg twice per day.   Continue farxiga 10 mg tablet daily.   Advise compliance with ADA diet and encourage exercise  [facility] - needs recheck.   Goes to outside.     2. HTN controlled, continue meds as previously prescribed and monitor.   On lisinopril and HCTZ - urine mac - not able to calculate - last done in 2019.   Will repeat with next labs.     3. HLd - LDL goal < 100. Meets goal   Currently on statin therapy- continue 10mg every HS.     4. Weight - BMI Body mass index is 33.1 kg/m².   Encourage Ada diet and exercise.   Continues on sglt2i.      5. Renal Function - stable. No history of nephropathy. Will continue to monitor.     6. Hypothyroid - s/p partial thyroidectomy from hyperthyroid/nodules - removed.  Has not needing thyroid hormone replacement. Defer to pcp for management.          Follow up in 6 months with OV and labs prior

## 2022-03-16 NOTE — PROGRESS NOTES
Just wanted to let you know that I got your mammogram results back and the radiologist reading is perfectly normal. Let me know if you have any questions or concerns  Hope you have a great day!  Dr Garrison

## 2022-04-11 ENCOUNTER — OFFICE VISIT (OUTPATIENT)
Dept: OBSTETRICS AND GYNECOLOGY | Facility: CLINIC | Age: 57
End: 2022-04-11
Attending: OBSTETRICS & GYNECOLOGY
Payer: COMMERCIAL

## 2022-04-11 VITALS
BODY MASS INDEX: 31.77 KG/M2 | DIASTOLIC BLOOD PRESSURE: 70 MMHG | WEIGHT: 172.63 LBS | HEIGHT: 62 IN | SYSTOLIC BLOOD PRESSURE: 116 MMHG

## 2022-04-11 DIAGNOSIS — Z01.419 ENCOUNTER FOR GYNECOLOGICAL EXAMINATION: Primary | ICD-10-CM

## 2022-04-11 PROCEDURE — 99999 PR PBB SHADOW E&M-EST. PATIENT-LVL III: ICD-10-PCS | Mod: PBBFAC,,, | Performed by: OBSTETRICS & GYNECOLOGY

## 2022-04-11 PROCEDURE — 3044F HG A1C LEVEL LT 7.0%: CPT | Mod: CPTII,S$GLB,, | Performed by: OBSTETRICS & GYNECOLOGY

## 2022-04-11 PROCEDURE — 99999 PR PBB SHADOW E&M-EST. PATIENT-LVL III: CPT | Mod: PBBFAC,,, | Performed by: OBSTETRICS & GYNECOLOGY

## 2022-04-11 PROCEDURE — 3078F DIAST BP <80 MM HG: CPT | Mod: CPTII,S$GLB,, | Performed by: OBSTETRICS & GYNECOLOGY

## 2022-04-11 PROCEDURE — 99396 PREV VISIT EST AGE 40-64: CPT | Mod: S$GLB,,, | Performed by: OBSTETRICS & GYNECOLOGY

## 2022-04-11 PROCEDURE — 4010F PR ACE/ARB THEARPY RXD/TAKEN: ICD-10-PCS | Mod: CPTII,S$GLB,, | Performed by: OBSTETRICS & GYNECOLOGY

## 2022-04-11 PROCEDURE — 1159F PR MEDICATION LIST DOCUMENTED IN MEDICAL RECORD: ICD-10-PCS | Mod: CPTII,S$GLB,, | Performed by: OBSTETRICS & GYNECOLOGY

## 2022-04-11 PROCEDURE — 3074F PR MOST RECENT SYSTOLIC BLOOD PRESSURE < 130 MM HG: ICD-10-PCS | Mod: CPTII,S$GLB,, | Performed by: OBSTETRICS & GYNECOLOGY

## 2022-04-11 PROCEDURE — 3078F PR MOST RECENT DIASTOLIC BLOOD PRESSURE < 80 MM HG: ICD-10-PCS | Mod: CPTII,S$GLB,, | Performed by: OBSTETRICS & GYNECOLOGY

## 2022-04-11 PROCEDURE — 4010F ACE/ARB THERAPY RXD/TAKEN: CPT | Mod: CPTII,S$GLB,, | Performed by: OBSTETRICS & GYNECOLOGY

## 2022-04-11 PROCEDURE — 3008F PR BODY MASS INDEX (BMI) DOCUMENTED: ICD-10-PCS | Mod: CPTII,S$GLB,, | Performed by: OBSTETRICS & GYNECOLOGY

## 2022-04-11 PROCEDURE — 3044F PR MOST RECENT HEMOGLOBIN A1C LEVEL <7.0%: ICD-10-PCS | Mod: CPTII,S$GLB,, | Performed by: OBSTETRICS & GYNECOLOGY

## 2022-04-11 PROCEDURE — 3074F SYST BP LT 130 MM HG: CPT | Mod: CPTII,S$GLB,, | Performed by: OBSTETRICS & GYNECOLOGY

## 2022-04-11 PROCEDURE — 3008F BODY MASS INDEX DOCD: CPT | Mod: CPTII,S$GLB,, | Performed by: OBSTETRICS & GYNECOLOGY

## 2022-04-11 PROCEDURE — 1159F MED LIST DOCD IN RCRD: CPT | Mod: CPTII,S$GLB,, | Performed by: OBSTETRICS & GYNECOLOGY

## 2022-04-11 PROCEDURE — 99396 PR PREVENTIVE VISIT,EST,40-64: ICD-10-PCS | Mod: S$GLB,,, | Performed by: OBSTETRICS & GYNECOLOGY

## 2022-04-11 NOTE — PROGRESS NOTES
LMP: No LMP recorded (lmp unknown). Patient has had a hysterectomy..  Meds per MD: none    Last Pap: N/A  Last MMG: 3-2022 birads 2, DIS  Last Colonoscopy: 7-2021 polyp  Last Bone Density: 2018 normal

## 2022-04-11 NOTE — PROGRESS NOTES
Chief Complaint Well woman exam:  Well Woman ( Hysterectomy  Last mammo 3/7/22 birags 2 )    She is established.     Yamileth Wong is a 56 y.o. female  presents for a well woman exam.    S/p BARTOLOME and BSO  Lost weight - has been eating better and is feeling great    ROS:  No abdominal pain No vaginal bleeding or discharge  No breast pain or masses, No rectal bleeding        Meds by MD:  LMP: none  S/p Hyst    Last pap: s/p Hyst No pap needed   Last MMG: 3/22 Birads 2   Last Colonoscopy: Catinis  One polyp repeat 5 yrs  Last Bone Density: 2018 normal, DIS       Past Medical History:   Diagnosis Date    Abnormal CT scan, chest 2017    At [facility], subcarinal 3 cm appears to be left lobe thyroid MRI 2017 [facility], Dr Haynes    Abnormal Pap smear of cervix     Anxiety 10/9/2013    Lexapro works well    Colon polyp     , repeat before     Hot thyroid nodule     Dr Haynes    Hyperlipidemia associated with type 2 diabetes mellitus 10/9/2013    Hypertension associated with diabetes 10/9/2013    Menopause     Uncontrolled type 2 diabetes mellitus with hyperglycemia, without long-term current use of insulin 2017    [facility]    Vaginal Pap smear 2013    Pap/Hpv negative  (Dr Garrison, [facility] [place])        Past Surgical History:   Procedure Laterality Date    HYSTERECTOMY      BARTOLOME/BSO for fibroids    NECK LESION BIOPSY      Neck needle Bx -- Thyroid Nodule     THYROIDECTOMY, PARTIAL      [facility]       OB History    Para Term  AB Living   0 0 0 0 0 0   SAB IAB Ectopic Multiple Live Births   0 0 0 0         Family History   Problem Relation Age of Onset    Heart attack Father     Cervical cancer Mother     Diabetes Maternal Grandmother     Hyperlipidemia Brother     Obesity Brother     Colon cancer Neg Hx     Breast cancer Neg Hx        Social History     Tobacco Use    Smoking status: Never Smoker    Smokeless tobacco:  I have reviewed discharge instructions with the patient. The patient verbalized understanding. Patient armband removed and shredded. "Never Used   Substance Use Topics    Alcohol use: Yes     Comment: Social     Drug use: No         Physical Exam:  /70   Ht 5' 2" (1.575 m)   Wt 78.3 kg (172 lb 9.9 oz)   LMP  (LMP Unknown) Comment: BARTOLOME/BSO  2007  BMI 31.57 kg/m²     APPEARANCE: Well nourished, well developed, in no acute distress.  AFFECT: WNL, alert and oriented x 3  SKIN: No acne or hirsutism  BREASTS: Symmetrical, no skin changes.                      No nipple discharge.   No palpable masses bilaterally  NODES: No inguinal nor axillary LAD  ABDOMEN: soft Non tender Non distended No masses  PELVIC:   Normal external genitalia without lesions.   Normal urethral meatus.   No signif cystocele or rectocele.  Vagina atrophic without lesions or discharge.  Cuff intact  Cervix absent  Adnexa without masses or tenderness.    EXTREMITIES: No edema.    ASSESSMENT AND PLAN  Yamileth was seen today for well woman.    Diagnoses and all orders for this visit:    Encounter for gynecological examination      Normal exam    MMG normal      Patient was counseled today on A.C.S. Pap guidelines and recommendations for yearly pelvic exams, mammograms and monthly self breast exams; to see her PCP for other health maintenance.     Patient encouraged to register for portal and results will be sent via portal.    No follow-ups on file.         Health Maintenance   Topic Date Due    Eye Exam  08/10/2021    Foot Exam  01/26/2022    Hemoglobin A1c  08/10/2022    Lipid Panel  02/10/2023    Low Dose Statin  02/16/2023    Mammogram  03/07/2023    TETANUS VACCINE  03/08/2028    Hepatitis C Screening  Completed                   "

## 2022-05-09 NOTE — TELEPHONE ENCOUNTER
Care Due:                  Date            Visit Type   Department     Provider  --------------------------------------------------------------------------------                                VIRTUAL      [facility]  Last Visit: 06-      AUDIO ONLY   CARE           Nicole Dickerson  Next Visit: None Scheduled  None         None Found                                                            Last  Test          Frequency    Reason                     Performed    Due Date  --------------------------------------------------------------------------------    Office Visit  12 months..  EScitalopram.............  06- 06-    [facility] Embedded Care Gaps. Reference number: [id number]. 5/09/2022   12:19:37 PM CDT

## 2022-05-10 NOTE — TELEPHONE ENCOUNTER
Refill Routing Note   Medication(s) are not appropriate for processing by [facility] for the following reason(s):      - Patient has not been seen in over 15 months by PCP    [facility] action(s):  Defer          Medication reconciliation completed: No     Appointments  past 12m or future 3m with PCP    Date Provider   Last Visit   6/25/2020 Nicole Dickerson MD   Next Visit   Visit date not found Nicole Dickerson MD   ED visits in past 90 days: 0        Note composed:3:45 PM 05/10/2022

## 2022-05-11 ENCOUNTER — PATIENT MESSAGE (OUTPATIENT)
Dept: PRIMARY CARE CLINIC | Facility: CLINIC | Age: 57
End: 2022-05-11
Payer: COMMERCIAL

## 2022-05-11 RX ORDER — ESCITALOPRAM OXALATE 20 MG/1
TABLET ORAL
Qty: 90 TABLET | Refills: 0 | Status: SHIPPED | OUTPATIENT
Start: 2022-05-11 | End: 2022-08-17 | Stop reason: SDUPTHER

## 2022-05-25 LAB
LEFT EYE DM RETINOPATHY: NEGATIVE
RIGHT EYE DM RETINOPATHY: NEGATIVE

## 2022-08-01 ENCOUNTER — TELEPHONE (OUTPATIENT)
Dept: PRIMARY CARE CLINIC | Facility: CLINIC | Age: 57
End: 2022-08-01
Payer: COMMERCIAL

## 2022-08-01 DIAGNOSIS — E11.9 CONTROLLED TYPE 2 DIABETES MELLITUS WITHOUT COMPLICATION, WITHOUT LONG-TERM CURRENT USE OF INSULIN: Primary | ICD-10-CM

## 2022-08-01 DIAGNOSIS — E11.59 HYPERTENSION ASSOCIATED WITH DIABETES: ICD-10-CM

## 2022-08-01 DIAGNOSIS — I15.2 HYPERTENSION ASSOCIATED WITH DIABETES: ICD-10-CM

## 2022-08-01 DIAGNOSIS — E11.69 HYPERLIPIDEMIA ASSOCIATED WITH TYPE 2 DIABETES MELLITUS: ICD-10-CM

## 2022-08-01 DIAGNOSIS — E78.5 HYPERLIPIDEMIA ASSOCIATED WITH TYPE 2 DIABETES MELLITUS: ICD-10-CM

## 2022-08-04 ENCOUNTER — TELEPHONE (OUTPATIENT)
Dept: PRIMARY CARE CLINIC | Facility: CLINIC | Age: 57
End: 2022-08-04
Payer: COMMERCIAL

## 2022-08-04 NOTE — TELEPHONE ENCOUNTER
----- Message from Lina Chaney sent at 8/4/2022  3:42 PM CDT -----  Contact: Amanda Ellis/Arlene/[phone]  Arlene said that she is calling in regards to pt had dental surgery today and the doctor wants to know if it is okay to prescribe a Medrol dose daisy to patient . Please advise

## 2022-08-04 NOTE — TELEPHONE ENCOUNTER
----- Message from Lina Chaney sent at 8/4/2022  3:42 PM CDT -----  Contact: Amanda Ellis/Arlene/[phone]  Arlene said that she is calling in regards to pt had dental surgery today and the doctor wants to know if it is okay to prescribe a Medrol dose daisy to patient . Please advise

## 2022-08-11 ENCOUNTER — PATIENT MESSAGE (OUTPATIENT)
Dept: INTERNAL MEDICINE | Facility: CLINIC | Age: 57
End: 2022-08-11
Payer: COMMERCIAL

## 2022-08-11 LAB
ALBUMIN SERPL-MCNC: 4.4 G/DL (ref 3.6–5.1)
ALBUMIN/GLOB SERPL: 1.9 (CALC) (ref 1–2.5)
ALP SERPL-CCNC: 39 U/L (ref 37–153)
ALT SERPL-CCNC: 11 U/L (ref 6–29)
AST SERPL-CCNC: 10 U/L (ref 10–35)
BILIRUB SERPL-MCNC: 0.6 MG/DL (ref 0.2–1.2)
BUN SERPL-MCNC: 7 MG/DL (ref 7–25)
BUN/CREAT SERPL: 15 (CALC) (ref 6–22)
CALCIUM SERPL-MCNC: 9.4 MG/DL (ref 8.6–10.4)
CHLORIDE SERPL-SCNC: 103 MMOL/L (ref 98–110)
CHOLEST SERPL-MCNC: 135 MG/DL
CHOLEST/HDLC SERPL: 2.1 (CALC)
CO2 SERPL-SCNC: 28 MMOL/L (ref 20–32)
CREAT SERPL-MCNC: 0.47 MG/DL (ref 0.5–1.03)
EGFR: 110 ML/MIN/1.73M2
GLOBULIN SER CALC-MCNC: 2.3 G/DL (CALC) (ref 1.9–3.7)
GLUCOSE SERPL-MCNC: 91 MG/DL (ref 65–99)
HBA1C MFR BLD: 5.8 % OF TOTAL HGB
HDLC SERPL-MCNC: 65 MG/DL
LDLC SERPL CALC-MCNC: 55 MG/DL (CALC)
NONHDLC SERPL-MCNC: 70 MG/DL (CALC)
POTASSIUM SERPL-SCNC: 4 MMOL/L (ref 3.5–5.3)
PROT SERPL-MCNC: 6.7 G/DL (ref 6.1–8.1)
SODIUM SERPL-SCNC: 140 MMOL/L (ref 135–146)
T4 FREE SERPL-MCNC: 1.1 NG/DL (ref 0.8–1.8)
TRIGL SERPL-MCNC: 71 MG/DL
TSH SERPL-ACNC: 1.66 MIU/L (ref 0.4–4.5)

## 2022-08-17 ENCOUNTER — PATIENT MESSAGE (OUTPATIENT)
Dept: PRIMARY CARE CLINIC | Facility: CLINIC | Age: 57
End: 2022-08-17
Payer: COMMERCIAL

## 2022-08-17 ENCOUNTER — PATIENT OUTREACH (OUTPATIENT)
Dept: ADMINISTRATIVE | Facility: HOSPITAL | Age: 57
End: 2022-08-17
Payer: COMMERCIAL

## 2022-08-17 RX ORDER — ESCITALOPRAM OXALATE 20 MG/1
20 TABLET ORAL DAILY
Qty: 90 TABLET | Refills: 0 | Status: SHIPPED | OUTPATIENT
Start: 2022-08-17 | End: 2022-10-12

## 2022-08-17 NOTE — PROGRESS NOTES
Patient due for the following    HIV Screening     Shingles Vaccine (1 of 2)    Pneumococcal Vaccines (Age 0-64) (2 - PCV)    Diabetes Urine Screening     COVID-19 Vaccine (3 - Booster for Pfizer series)    Foot Exam       E-faxed Dr. Wellington for eye exam records    Immunizations: reviewed and updated  Care Everywhere: triggered  Care Teams: up to date  Outreach: none needed

## 2022-08-17 NOTE — LETTER
AUTHORIZATION FOR RELEASE OF   CONFIDENTIAL INFORMATION    Dear Dr. Wellington,    We are seeing Yamileth Wong, date of birth 1965, in the clinic at [facility]. Nicole Dickerson MD is the patient's PCP. Yamileth Wong has an outstanding lab/procedure at the time we reviewed her chart. In order to help keep her health information updated, she has authorized us to request the following medical record(s):        (  )  MAMMOGRAM                                      (  )  COLONOSCOPY      (  )  PAP SMEAR                                          (  )  OUTSIDE LAB RESULTS     (  )  DEXA SCAN                                          ( X )  EYE EXAM            (  )  FOOT EXAM                                          (  )  ENTIRE RECORD     (  )  OUTSIDE IMMUNIZATIONS                 (  )  _______________         Please fax records to [facility], Tara Berner, MD, [phone]     If you have any questions, please contact Omayra at [phone].           Patient Name: Yamileth Wong  : 1965  Patient Phone #: [phone]

## 2022-08-17 NOTE — TELEPHONE ENCOUNTER
Care Due:                  Date            Visit Type   Department     Provider  --------------------------------------------------------------------------------    Last Visit: None Found      None         None Found  Next Visit: None Scheduled  None         None Found                                                            Last  Test          Frequency    Reason                     Performed    Due Date  --------------------------------------------------------------------------------    Office Visit  12 months..  EScitalopram.............  Not Found    Overdue    [employer] Embedded Care Gaps. Reference number: [id number]. 8/17/2022   10:23:25 AM JUAN CARLOS

## 2022-08-24 ENCOUNTER — OFFICE VISIT (OUTPATIENT)
Dept: INTERNAL MEDICINE | Facility: CLINIC | Age: 57
End: 2022-08-24
Payer: COMMERCIAL

## 2022-08-24 VITALS — BODY MASS INDEX: 30.54 KG/M2 | WEIGHT: 167 LBS

## 2022-08-24 DIAGNOSIS — I15.2 HYPERTENSION ASSOCIATED WITH DIABETES: ICD-10-CM

## 2022-08-24 DIAGNOSIS — E78.5 HYPERLIPIDEMIA ASSOCIATED WITH TYPE 2 DIABETES MELLITUS: ICD-10-CM

## 2022-08-24 DIAGNOSIS — E11.69 HYPERLIPIDEMIA ASSOCIATED WITH TYPE 2 DIABETES MELLITUS: ICD-10-CM

## 2022-08-24 DIAGNOSIS — E11.9 CONTROLLED TYPE 2 DIABETES MELLITUS WITHOUT COMPLICATION, WITHOUT LONG-TERM CURRENT USE OF INSULIN: Primary | ICD-10-CM

## 2022-08-24 DIAGNOSIS — E11.59 HYPERTENSION ASSOCIATED WITH DIABETES: ICD-10-CM

## 2022-08-24 PROCEDURE — 1160F PR REVIEW ALL MEDS BY PRESCRIBER/CLIN PHARMACIST DOCUMENTED: ICD-10-PCS | Mod: CPTII,95,, | Performed by: NURSE PRACTITIONER

## 2022-08-24 PROCEDURE — 4010F ACE/ARB THERAPY RXD/TAKEN: CPT | Mod: CPTII,95,, | Performed by: NURSE PRACTITIONER

## 2022-08-24 PROCEDURE — 3008F PR BODY MASS INDEX (BMI) DOCUMENTED: ICD-10-PCS | Mod: CPTII,95,, | Performed by: NURSE PRACTITIONER

## 2022-08-24 PROCEDURE — 4010F PR ACE/ARB THEARPY RXD/TAKEN: ICD-10-PCS | Mod: CPTII,95,, | Performed by: NURSE PRACTITIONER

## 2022-08-24 PROCEDURE — 1159F PR MEDICATION LIST DOCUMENTED IN MEDICAL RECORD: ICD-10-PCS | Mod: CPTII,95,, | Performed by: NURSE PRACTITIONER

## 2022-08-24 PROCEDURE — 1160F RVW MEDS BY RX/DR IN RCRD: CPT | Mod: CPTII,95,, | Performed by: NURSE PRACTITIONER

## 2022-08-24 PROCEDURE — 1159F MED LIST DOCD IN RCRD: CPT | Mod: CPTII,95,, | Performed by: NURSE PRACTITIONER

## 2022-08-24 PROCEDURE — 3044F HG A1C LEVEL LT 7.0%: CPT | Mod: CPTII,95,, | Performed by: NURSE PRACTITIONER

## 2022-08-24 PROCEDURE — 3044F PR MOST RECENT HEMOGLOBIN A1C LEVEL <7.0%: ICD-10-PCS | Mod: CPTII,95,, | Performed by: NURSE PRACTITIONER

## 2022-08-24 PROCEDURE — 99214 OFFICE O/P EST MOD 30 MIN: CPT | Mod: 95,,, | Performed by: NURSE PRACTITIONER

## 2022-08-24 PROCEDURE — 3008F BODY MASS INDEX DOCD: CPT | Mod: CPTII,95,, | Performed by: NURSE PRACTITIONER

## 2022-08-24 PROCEDURE — 99214 PR OFFICE/OUTPT VISIT, EST, LEVL IV, 30-39 MIN: ICD-10-PCS | Mod: 95,,, | Performed by: NURSE PRACTITIONER

## 2022-08-24 RX ORDER — DAPAGLIFLOZIN 10 MG/1
TABLET, FILM COATED ORAL
Qty: 90 TABLET | Refills: 3 | Status: SHIPPED | OUTPATIENT
Start: 2022-08-24 | End: 2023-11-13 | Stop reason: SDUPTHER

## 2022-08-24 RX ORDER — LISINOPRIL 10 MG/1
10 TABLET ORAL DAILY
Qty: 90 TABLET | Refills: 3 | Status: SHIPPED | OUTPATIENT
Start: 2022-08-24 | End: 2023-07-13

## 2022-08-24 RX ORDER — INSULIN PUMP SYRINGE, 3 ML
1 EACH MISCELLANEOUS DAILY
Qty: 1 EACH | Refills: 0 | Status: SHIPPED | OUTPATIENT
Start: 2022-08-24

## 2022-08-24 RX ORDER — LANCETS 28 GAUGE
1 EACH MISCELLANEOUS
Qty: 100 EACH | Refills: 6 | Status: SHIPPED | OUTPATIENT
Start: 2022-08-24

## 2022-08-24 NOTE — PROGRESS NOTES
The patient location is: at home.   The chief complaint leading to consultation is: T2dm     Visit type: audiovisual    Face to Face time with patient: 30 minutes  40 minutes of total time spent on the encounter, which includes face to face time and non-face to face time preparing to see the patient (eg, review of tests), Obtaining and/or reviewing separately obtained history, Documenting clinical information in the electronic or other health record, Independently interpreting results (not separately reported) and communicating results to the patient/family/caregiver, or Care coordination (not separately reported).     Each patient to whom he or she provides medical services by telemedicine is:  (1) informed of the relationship between the physician and patient and the respective role of any other health care provider with respect to management of the patient; and (2) notified that he or she may decline to receive medical services by telemedicine and may withdraw from such care at any time.      Notes for today's visit:     56 y.o. female here for 6 month follow up visit for management of T2dm -   Last seen February 2022 - those notes below.     a1c continues to be well controlled- now down from 6.3% to 5.8%.   She has continued to lose weight.   Now down from 220 lbs --> 181 lbs, now to 167 lbs.   Her goal is 150 lbs - right now.   She has lost 50 + lbs with lifestyle and diet changes.      Continues on metformin 1000 mg twice per day    and farxiga 10 mg tablet daily in morning time.   Denies any known side effects from either medication -   Not checking blood sugars routinely - her glucometer battery went out - so she requests new meter with testing supplies.     Her tsh and free t4 is normal on labs.   S/p partial thyroidectomy in the past 2 to nodules.   Goes to her ENT to follow up with her thyroid and get ultrasound done per routine to monitor the area.     HTN: lisinopril/hctz 10/12.5mg tablet daily for her  blood pressure.   She has noticed her bp running on lower end 110/70's.   She is feeling well.  Her lipids are at goal and she continues on statin daily.      No acute complaints today's visit.         Last visit from February 2022:   56 y.o. female, here for 1 year check up for management of T2dm -   Last seen for initial consult in January 2021 - was a virtual visit - those notes below -     a1c reduced from 7.3% to 6.3%.   She has lost weight - back to normal pants size.   She had gained weight at onset of pandemic, now much improved.   Thinks she was around 220 lbs and is now 181 lbs today.   Now is eating healthier -   Exercising some now - yard work and walking.   Working from home now but is fully acclimated to this/enjoys.     History of partial thyroidectomy - has never needed thyroid hormone. Usually follows with ENT regularly.   BP controlled - on lisinopril/hctz.   Cholesterol at goal - on lipitor every Hs.   For her diabetes:   She continues on metformin 1000mg twice per day.   Last year after our initial consult, I switched her from glipizide to farxiga daily.   She began and helped, increased dose from 5 to 10mg - she is taking.   Tolerating well and no side effects.   No acute complaints today's visit.       Last visit notes from January 2021: (audiovisual visit).   HPI: Yamileth Wong is a 56 y.o.  female c/I for visit to address Diabetes Type 2  This is the first time I am seeing her for care, follows with Dr. Nicole Dickerson MD for primary care needs.   Has not seen endocrinology or diabetes specialist in the past.     was diagnosed with T2DM about 25 years ago.   Brother also has T2dm, sister also had Diabetes borderline - and now passed away.   Has never been hospitalized r/t DM.  Denies missing doses of DM medication.   She has been on metformin 1000mg twice per day   And taking glipizide 10mg daily.     a1c has slowly gone up -   Last year was @ 5.8% and is now up to currently 7.3%.    Reports has struggled on and off with her weight - weight gain and weight loss.   Recently 25 lbs weight gain this year  - relates mostly to change in lifestyle and covid pandemic.   Has now been working at home.     Complications from diabetes -   Denies nephropathy.   Denies Diabetic retinopathy.   + history of HLD and HTN.   Denies CV disease.     No acute complaints today's visit.   She is interested in tips and other medications that could hopefully manage her diabetes better than she is doing now.   She is motivated to start eating better and working on eliminating some carbs from her diet.     Past medical History:   Past Medical History:   Diagnosis Date    Abnormal CT scan, chest 03/14/2017    [facility], subcarinal 3 cm appears to be left lobe thyroid MRI July 2017 [facility], Dr Haynes    Abnormal Pap smear of cervix     Anxiety 10/9/2013    Lexapro works well    Colon polyp     2016, repeat before 2021    Hot thyroid nodule 2009    Dr Haynes    Hyperlipidemia associated with type 2 diabetes mellitus 10/9/2013    Hypertension associated with diabetes 10/9/2013    Menopause     Uncontrolled type 2 diabetes mellitus with hyperglycemia, without long-term current use of insulin 03/14/2017    [facility]    Vaginal Pap smear 02/25/2013    Pap/Hpv negative  (Dr Garrison, [facility] [place])       Family hx:   Family History   Problem Relation Age of Onset    Heart attack Father     Cervical cancer Mother     Diabetes Maternal Grandmother     Hyperlipidemia Brother     Obesity Brother     Colon cancer Neg Hx     Breast cancer Neg Hx       Current meds:   Current Outpatient Medications:     atorvastatin (LIPITOR) 10 MG tablet, Take 1 tablet (10 mg total) by mouth every evening., Disp: 90 tablet, Rfl: 3    blood sugar diagnostic (BLOOD GLUCOSE TEST) Strp, 1 strip by Misc.(Non-Drug; Combo Route) route 2 (two) times daily before meals., Disp: 100 strip, Rfl: 6    blood-glucose meter kit,  1 each by Other route once daily. Use as instructed, Disp: 1 each, Rfl: 0    dapagliflozin (FARXIGA) 10 mg tablet, Take one tablet by mouth daily, Disp: 90 tablet, Rfl: 3    EScitalopram oxalate (LEXAPRO) 20 MG tablet, Take 1 tablet (20 mg total) by mouth once daily., Disp: 90 tablet, Rfl: 0    lancets (ACTI-AROLDO LANCETS) 28 gauge Misc, Inject 1 lancet into the skin 2 (two) times daily before meals., Disp: 100 each, Rfl: 6    lancets Misc, Test bid, Disp: 100 each, Rfl: 3    lisinopriL 10 MG tablet, Take 1 tablet (10 mg total) by mouth once daily., Disp: 90 tablet, Rfl: 3    metFORMIN (GLUCOPHAGE) 500 MG tablet, Take 2 tablets (1,000 mg total) by mouth 2 (two) times daily with meals., Disp: 360 tablet, Rfl: 3     Current Diabetes medications:   Metformin 500mg - 2 tablets po bid.   farxiga 10mg tablet daily.     Medications Tried and Failed:   Glipizide 10mg tablet daily.     Review of Pertinent co-morbidities/risk factors:   CV: Denies history of MI nor stroke.   CAD: Denies.  Does not take aspirin daily.   BP: has history of HTN  Statin: Taking  ACE/ARB: Taking    Social History     Tobacco Use   Smoking Status Never Smoker   Smokeless Tobacco Never Used        Social:   Lives at home with long term male partner  Life changes/stressors currently: working from home/change in lifestyle - less social contact.   Diet: following ADA diet   Meals: 3 per day and small amount of snacks only.        Breakfast - protein bar - something quick       Lunch - turkey sandiwch.        Dinner - beans and rice, roast, green beans and carrots and turkey sausage       Snacks - none        Drinks -coffee and water. Diet cranberry juice.diet snapple.  Exercise: none.   Activities: working full time from home - working long hours. 12 hour days.     Glucose Monitoring:   Does not check sugars. Needs new meter to work.     Standards of care:   Eyes: .: 07/29/2022  Foot exam: : 01/26/2021   Diabetes education: None.    Vital  Signs  Wt 75.8 kg (167 lb)   LMP  (LMP Unknown) Comment: BARTOLOME/BSO  2007  BMI 30.54 kg/m²     Pertinent Labs:   Hgba1c   Lab Results   Component Value Date    HGBA1C 5.8 (H) 08/10/2022    HGBA1C 6.3 (H) 02/10/2022    HGBA1C 6.8 (H) 04/26/2021     Lipid panel   Lab Results   Component Value Date    CHOL 135 08/10/2022    CHOL 166 02/10/2022    CHOL 174 04/26/2021     Lab Results   Component Value Date    HDL 65 08/10/2022    HDL 62 02/10/2022    HDL 64 04/26/2021     Lab Results   Component Value Date    LDLCALC 55 08/10/2022    LDLCALC 84 02/10/2022    LDLCALC 89 04/26/2021     Lab Results   Component Value Date    TRIG 71 08/10/2022    TRIG 104 02/10/2022    TRIG 117 04/26/2021     Lab Results   Component Value Date    CHOLHDL 2.1 08/10/2022    CHOLHDL 2.7 02/10/2022    CHOLHDL 2.7 04/26/2021      CMP  Glucose   Date Value Ref Range Status   08/10/2022 91 65 - 99 mg/dL Final     Comment:                   Fasting reference interval          BUN   Date Value Ref Range Status   08/10/2022 7 7 - 25 mg/dL Final     Creatinine   Date Value Ref Range Status   08/10/2022 0.47 (L) 0.50 - 1.03 mg/dL Final     eGFR if    Date Value Ref Range Status   02/10/2022 123 > OR = 60 mL/min/1.73m2 Final     eGFR if non    Date Value Ref Range Status   02/10/2022 106 > OR = 60 mL/min/1.73m2 Final     AST   Date Value Ref Range Status   08/10/2022 10 10 - 35 U/L Final     ALT   Date Value Ref Range Status   08/10/2022 11 6 - 29 U/L Final     Microalbumin creatinine ratio:   Lab Results   Component Value Date    MICALBCREAT Unable to calculate 02/12/2019       Review Of Systems:   Gen: Appetite good, 50 + lbs weight loss, denies fatigue, Denies polydipsia.  Skin: Skin is intact and heals well, denies any rashes or hair changes.   EENT: Denies any acute visual disturbances, nor blurred vision.   History of partial thyroidectomy and some thyroid nodules.   Resp: Denies SOB or Dyspnea on exertion, denies  cough.   Cardiac: Denies chest pain, palpitations, or swelling.   GI: Denies abdominal pain, nausea or vomiting, diarrhea, or constipation.   /GYN: Denies nocturia, nor burning, frequency or pain on urination.  MS/Neuro: Denies numbness/ tingling in BLE; Gait steady, speech clear  Psych: Denies drug/ETOH abuse, hx depression but no acute flares controlled.   Other systems: negative.    Physical Exam - limited as this was a virtual visit:     Constitutional:       Appearance: Normal appearance.   HENT:      Head: Normocephalic.      Nose: Nose normal.      Eyes:      Extraocular Movements: Extraocular movements intact.      Pulmonary:      Effort: Pulmonary effort is normal.           Neurological: General: No focal deficit present.      Mental Status: She is alert and oriented to person, place, and time.     Psychiatric:         Mood and Affect: Mood normal.         Behavior: Behavior normal.         Thought Content: Thought content normal.         Judgment: Judgment normal.       Assessment and Plan of Care:     Diagnoses and all orders for this visit:    Controlled type 2 diabetes mellitus without complication, without long-term current use of insulin  -     blood-glucose meter kit; 1 each by Other route once daily. Use as instructed  -     blood sugar diagnostic (BLOOD GLUCOSE TEST) Strp; 1 strip by Misc.(Non-Drug; Combo Route) route 2 (two) times daily before meals.  -     lancets (ACTI-AROLDO LANCETS) 28 gauge Misc; Inject 1 lancet into the skin 2 (two) times daily before meals.  -     dapagliflozin (FARXIGA) 10 mg tablet; Take one tablet by mouth daily  -     Hemoglobin A1C; Future  -     Microalbumin/Creatinine Ratio, Urine; Future  -     Comprehensive Metabolic Panel; Future    Hypertension associated with diabetes  -     lisinopriL 10 MG tablet; Take 1 tablet (10 mg total) by mouth once daily.    Hyperlipidemia associated with type 2 diabetes mellitus  -     Lipid Panel; Future     1. T2DM with  hyperglycemia- Hgba1c 5.8%---> 6.8%---> 7.3%- 6.3% --> 5.8% current.    discussed DM, progression of disease, long term complications, CV risk factors and tx options.   For now, continue metformin 1000 mg twice per day. Advised her today if bg's start trending down, we might be eliminate one of the metformin 1000 mg tablets daily.   Continue farxiga 10 mg tablet daily.   Advise compliance with ADA diet and encourage exercise  [facility] - needs recheck.   Goes to outside.     2. HTN controlled, continue meds as previously prescribed and monitor.   On lisinopril and HCTZ - urine mac - not able to calculate - last done in 2019.   Will repeat with next labs.   She reports her bp is on lower end now - she has lost almost 50 lbs.   I changed her lisinopril/hctz to just plain lisinopril 10 daily -   And explained to her as she is losing weight, her bp is likely coming down nicely as well.     3. HLD - LDL goal < 100. Meets goal   Currently on statin therapy- continue 10mg every HS.     4. Weight - BMI Body mass index is 30.54 kg/m².   Encourage Ada diet and exercise.   Continues on sglt2i.      5. Renal Function - stable. No history of nephropathy. Will continue to monitor.     6. Hypothyroid - s/p partial thyroidectomy from hyperthyroid/nodules - removed.  Has not needing thyroid hormone replacement. Defer to pcp for management.   tsh and free t4 are wnl.          Follow up in 1 year

## 2022-08-24 NOTE — PATIENT INSTRUCTIONS
Continue farxiga 10mg tablet daily.   Continue metformin 1000mg twice per day - if blood sugars start trending down further (less than 80 and staying low) - you can eliminate one the metformin tablets (evening dose likely) -     Check the glucose if you can weekly or a few days per week before your meal.   Normal fasting glucose should range 80 - 120's before a meal.     Continue diabetic diet.

## 2022-08-29 ENCOUNTER — PATIENT OUTREACH (OUTPATIENT)
Dept: ADMINISTRATIVE | Facility: HOSPITAL | Age: 57
End: 2022-08-29
Payer: COMMERCIAL

## 2022-08-29 NOTE — PROGRESS NOTES
Patient due for the following   Health Maintenance Due    HIV Screening     Shingles Vaccine (1 of 2)    Pneumococcal Vaccines (Age 0-64) (2 - PCV)    Diabetes Urine Screening     COVID-19 Vaccine (3 - Booster for Pfizer series)    Foot Exam       Received eye exam records.  Scanned to media.  updated    Immunizations: reviewed and updated  Care Everywhere: triggered  Care Teams: up to date  Outreach:  none needed

## 2022-10-12 DIAGNOSIS — E11.9 CONTROLLED TYPE 2 DIABETES MELLITUS WITHOUT COMPLICATION, WITHOUT LONG-TERM CURRENT USE OF INSULIN: Primary | ICD-10-CM

## 2022-10-12 RX ORDER — ESCITALOPRAM OXALATE 20 MG/1
TABLET ORAL
Qty: 90 TABLET | Refills: 0 | Status: SHIPPED | OUTPATIENT
Start: 2022-10-12 | End: 2023-01-10

## 2022-10-12 NOTE — TELEPHONE ENCOUNTER
No new care gaps identified.  [facility] Embedded Care Gaps. Reference number: [id number]. 10/12/2022   5:23:10 AM CDT

## 2022-10-28 ENCOUNTER — TELEPHONE (OUTPATIENT)
Dept: PRIMARY CARE CLINIC | Facility: CLINIC | Age: 57
End: 2022-10-28
Payer: COMMERCIAL

## 2022-10-28 NOTE — TELEPHONE ENCOUNTER
----- Message from Montse Hendricks sent at 10/28/2022  1:32 PM CDT -----  Contact: Pt [phone]  She wants to know if she has to fast for the UA.    She said you can send a msg via the portal if can't reach her by phone

## 2022-10-28 NOTE — TELEPHONE ENCOUNTER
----- Message from Cherelle Knowles sent at 10/28/2022  1:23 PM CDT -----  Contact: pt   Pt is requesting orders for UA to be sent to [email or web address],pls advise

## 2022-11-02 LAB
ALBUMIN/CREAT UR: 25 MCG/MG CREAT
CREAT UR-MCNC: 12 MG/DL (ref 20–275)
MICROALBUMIN UR-MCNC: 0.3 MG/DL

## 2022-11-15 RX ORDER — ESCITALOPRAM OXALATE 20 MG/1
20 TABLET ORAL DAILY
Qty: 90 TABLET | Refills: 0 | Status: CANCELLED | OUTPATIENT
Start: 2022-11-15

## 2022-11-15 NOTE — TELEPHONE ENCOUNTER
Care Due:                  Date            Visit Type   Department     Provider  --------------------------------------------------------------------------------    Last Visit: None Found      None         None Found  Next Visit: None Scheduled  None         None Found                                                            Last  Test          Frequency    Reason                     Performed    Due Date  --------------------------------------------------------------------------------    Office Visit  12 months..  EScitalopram.............  Not Found    Overdue    [employer] Embedded Care Gaps. Reference number: [id number]. 11/15/2022   10:37:19 AM CST

## 2023-01-10 DIAGNOSIS — E78.5 HYPERLIPIDEMIA ASSOCIATED WITH TYPE 2 DIABETES MELLITUS: ICD-10-CM

## 2023-01-10 DIAGNOSIS — Z00.00 ROUTINE GENERAL MEDICAL EXAMINATION AT A HEALTH CARE FACILITY: Primary | ICD-10-CM

## 2023-01-10 DIAGNOSIS — E11.9 CONTROLLED TYPE 2 DIABETES MELLITUS WITHOUT COMPLICATION, WITHOUT LONG-TERM CURRENT USE OF INSULIN: ICD-10-CM

## 2023-01-10 DIAGNOSIS — I15.2 HYPERTENSION ASSOCIATED WITH DIABETES: ICD-10-CM

## 2023-01-10 DIAGNOSIS — E11.69 HYPERLIPIDEMIA ASSOCIATED WITH TYPE 2 DIABETES MELLITUS: ICD-10-CM

## 2023-01-10 DIAGNOSIS — E11.59 HYPERTENSION ASSOCIATED WITH DIABETES: ICD-10-CM

## 2023-01-10 RX ORDER — ESCITALOPRAM OXALATE 20 MG/1
TABLET ORAL
Qty: 90 TABLET | Refills: 0 | Status: SHIPPED | OUTPATIENT
Start: 2023-01-10 | End: 2023-04-11

## 2023-01-10 NOTE — TELEPHONE ENCOUNTER
No new care gaps identified.  [facility] Embedded Care Gaps. Reference number: [id number]. 1/10/2023   5:24:01 AM CST

## 2023-02-09 LAB
ALBUMIN SERPL-MCNC: 4.6 G/DL (ref 3.6–5.1)
ALBUMIN/CREAT UR: 9 MCG/MG CREAT
ALBUMIN/GLOB SERPL: 2.1 (CALC) (ref 1–2.5)
ALP SERPL-CCNC: 36 U/L (ref 37–153)
ALT SERPL-CCNC: 13 U/L (ref 6–29)
AST SERPL-CCNC: 13 U/L (ref 10–35)
BASOPHILS # BLD AUTO: 38 CELLS/UL (ref 0–200)
BASOPHILS NFR BLD AUTO: 0.8 %
BILIRUB SERPL-MCNC: 0.5 MG/DL (ref 0.2–1.2)
BUN SERPL-MCNC: 10 MG/DL (ref 7–25)
BUN/CREAT SERPL: ABNORMAL (CALC) (ref 6–22)
CALCIUM SERPL-MCNC: 9.2 MG/DL (ref 8.6–10.4)
CHLORIDE SERPL-SCNC: 102 MMOL/L (ref 98–110)
CO2 SERPL-SCNC: 26 MMOL/L (ref 20–32)
CREAT SERPL-MCNC: 0.53 MG/DL (ref 0.5–1.03)
CREAT UR-MCNC: 88 MG/DL (ref 20–275)
EGFR: 106 ML/MIN/1.73M2
EOSINOPHIL # BLD AUTO: 118 CELLS/UL (ref 15–500)
EOSINOPHIL NFR BLD AUTO: 2.5 %
ERYTHROCYTE [DISTWIDTH] IN BLOOD BY AUTOMATED COUNT: 12.7 % (ref 11–15)
GLOBULIN SER CALC-MCNC: 2.2 G/DL (CALC) (ref 1.9–3.7)
GLUCOSE SERPL-MCNC: 105 MG/DL (ref 65–99)
HBA1C MFR BLD: 5.6 % OF TOTAL HGB
HCT VFR BLD AUTO: 40.6 % (ref 35–45)
HGB BLD-MCNC: 13.5 G/DL (ref 11.7–15.5)
LYMPHOCYTES # BLD AUTO: 1537 CELLS/UL (ref 850–3900)
LYMPHOCYTES NFR BLD AUTO: 32.7 %
MCH RBC QN AUTO: 29.7 PG (ref 27–33)
MCHC RBC AUTO-ENTMCNC: 33.3 G/DL (ref 32–36)
MCV RBC AUTO: 89.2 FL (ref 80–100)
MICROALBUMIN UR-MCNC: 0.8 MG/DL
MONOCYTES # BLD AUTO: 371 CELLS/UL (ref 200–950)
MONOCYTES NFR BLD AUTO: 7.9 %
NEUTROPHILS # BLD AUTO: 2637 CELLS/UL (ref 1500–7800)
NEUTROPHILS NFR BLD AUTO: 56.1 %
PLATELET # BLD AUTO: 231 THOUSAND/UL (ref 140–400)
PMV BLD REES-ECKER: 11.5 FL (ref 7.5–12.5)
POTASSIUM SERPL-SCNC: 4 MMOL/L (ref 3.5–5.3)
PROT SERPL-MCNC: 6.8 G/DL (ref 6.1–8.1)
RBC # BLD AUTO: 4.55 MILLION/UL (ref 3.8–5.1)
SODIUM SERPL-SCNC: 139 MMOL/L (ref 135–146)
WBC # BLD AUTO: 4.7 THOUSAND/UL (ref 3.8–10.8)

## 2023-02-16 ENCOUNTER — OFFICE VISIT (OUTPATIENT)
Dept: PRIMARY CARE CLINIC | Facility: CLINIC | Age: 58
End: 2023-02-16
Payer: COMMERCIAL

## 2023-02-16 ENCOUNTER — PATIENT MESSAGE (OUTPATIENT)
Dept: PRIMARY CARE CLINIC | Facility: CLINIC | Age: 58
End: 2023-02-16

## 2023-02-16 VITALS
TEMPERATURE: 98 F | OXYGEN SATURATION: 98 % | HEART RATE: 57 BPM | BODY MASS INDEX: 32.17 KG/M2 | DIASTOLIC BLOOD PRESSURE: 70 MMHG | HEIGHT: 62 IN | WEIGHT: 174.81 LBS | SYSTOLIC BLOOD PRESSURE: 120 MMHG

## 2023-02-16 DIAGNOSIS — J30.2 SEASONAL ALLERGIES: ICD-10-CM

## 2023-02-16 DIAGNOSIS — E11.9 CONTROLLED TYPE 2 DIABETES MELLITUS WITHOUT COMPLICATION, WITHOUT LONG-TERM CURRENT USE OF INSULIN: ICD-10-CM

## 2023-02-16 DIAGNOSIS — E11.69 HYPERLIPIDEMIA ASSOCIATED WITH TYPE 2 DIABETES MELLITUS: ICD-10-CM

## 2023-02-16 DIAGNOSIS — E11.9 ENCOUNTER FOR COMPREHENSIVE DIABETIC FOOT EXAMINATION, TYPE 2 DIABETES MELLITUS: ICD-10-CM

## 2023-02-16 DIAGNOSIS — M65.312 TRIGGER FINGER OF LEFT THUMB: ICD-10-CM

## 2023-02-16 DIAGNOSIS — E78.5 HYPERLIPIDEMIA ASSOCIATED WITH TYPE 2 DIABETES MELLITUS: ICD-10-CM

## 2023-02-16 DIAGNOSIS — Z00.00 ROUTINE GENERAL MEDICAL EXAMINATION AT A HEALTH CARE FACILITY: Primary | ICD-10-CM

## 2023-02-16 DIAGNOSIS — I15.2 HYPERTENSION ASSOCIATED WITH DIABETES: ICD-10-CM

## 2023-02-16 DIAGNOSIS — R63.4 WEIGHT LOSS: ICD-10-CM

## 2023-02-16 DIAGNOSIS — E11.59 HYPERTENSION ASSOCIATED WITH DIABETES: ICD-10-CM

## 2023-02-16 PROCEDURE — 3061F NEG MICROALBUMINURIA REV: CPT | Mod: CPTII,S$GLB,, | Performed by: FAMILY MEDICINE

## 2023-02-16 PROCEDURE — 99999 PR PBB SHADOW E&M-EST. PATIENT-LVL IV: ICD-10-PCS | Mod: PBBFAC,,, | Performed by: FAMILY MEDICINE

## 2023-02-16 PROCEDURE — 99999 PR PBB SHADOW E&M-EST. PATIENT-LVL IV: CPT | Mod: PBBFAC,,, | Performed by: FAMILY MEDICINE

## 2023-02-16 PROCEDURE — 3078F DIAST BP <80 MM HG: CPT | Mod: CPTII,S$GLB,, | Performed by: FAMILY MEDICINE

## 2023-02-16 PROCEDURE — 3044F PR MOST RECENT HEMOGLOBIN A1C LEVEL <7.0%: ICD-10-PCS | Mod: CPTII,S$GLB,, | Performed by: FAMILY MEDICINE

## 2023-02-16 PROCEDURE — 99396 PR PREVENTIVE VISIT,EST,40-64: ICD-10-PCS | Mod: S$GLB,,, | Performed by: FAMILY MEDICINE

## 2023-02-16 PROCEDURE — 3061F PR NEG MICROALBUMINURIA RESULT DOCUMENTED/REVIEW: ICD-10-PCS | Mod: CPTII,S$GLB,, | Performed by: FAMILY MEDICINE

## 2023-02-16 PROCEDURE — 1159F MED LIST DOCD IN RCRD: CPT | Mod: CPTII,S$GLB,, | Performed by: FAMILY MEDICINE

## 2023-02-16 PROCEDURE — 1159F PR MEDICATION LIST DOCUMENTED IN MEDICAL RECORD: ICD-10-PCS | Mod: CPTII,S$GLB,, | Performed by: FAMILY MEDICINE

## 2023-02-16 PROCEDURE — 3078F PR MOST RECENT DIASTOLIC BLOOD PRESSURE < 80 MM HG: ICD-10-PCS | Mod: CPTII,S$GLB,, | Performed by: FAMILY MEDICINE

## 2023-02-16 PROCEDURE — 3044F HG A1C LEVEL LT 7.0%: CPT | Mod: CPTII,S$GLB,, | Performed by: FAMILY MEDICINE

## 2023-02-16 PROCEDURE — 99396 PREV VISIT EST AGE 40-64: CPT | Mod: S$GLB,,, | Performed by: FAMILY MEDICINE

## 2023-02-16 PROCEDURE — 3008F BODY MASS INDEX DOCD: CPT | Mod: CPTII,S$GLB,, | Performed by: FAMILY MEDICINE

## 2023-02-16 PROCEDURE — 3074F PR MOST RECENT SYSTOLIC BLOOD PRESSURE < 130 MM HG: ICD-10-PCS | Mod: CPTII,S$GLB,, | Performed by: FAMILY MEDICINE

## 2023-02-16 PROCEDURE — 3066F PR DOCUMENTATION OF TREATMENT FOR NEPHROPATHY: ICD-10-PCS | Mod: CPTII,S$GLB,, | Performed by: FAMILY MEDICINE

## 2023-02-16 PROCEDURE — 3008F PR BODY MASS INDEX (BMI) DOCUMENTED: ICD-10-PCS | Mod: CPTII,S$GLB,, | Performed by: FAMILY MEDICINE

## 2023-02-16 PROCEDURE — 3074F SYST BP LT 130 MM HG: CPT | Mod: CPTII,S$GLB,, | Performed by: FAMILY MEDICINE

## 2023-02-16 PROCEDURE — 1160F PR REVIEW ALL MEDS BY PRESCRIBER/CLIN PHARMACIST DOCUMENTED: ICD-10-PCS | Mod: CPTII,S$GLB,, | Performed by: FAMILY MEDICINE

## 2023-02-16 PROCEDURE — 1160F RVW MEDS BY RX/DR IN RCRD: CPT | Mod: CPTII,S$GLB,, | Performed by: FAMILY MEDICINE

## 2023-02-16 PROCEDURE — 3066F NEPHROPATHY DOC TX: CPT | Mod: CPTII,S$GLB,, | Performed by: FAMILY MEDICINE

## 2023-02-16 RX ORDER — ERYTHROMYCIN 500 MG/1
1 TABLET, DELAYED RELEASE ORAL EVERY 6 HOURS
COMMUNITY
Start: 2023-01-28

## 2023-02-16 NOTE — PROGRESS NOTES
Subjective:      Patient ID: Yamileth Wong is a 57 y.o. female.    Chief Complaint: Annual Exam    Here today for general exam & diabetes mellitus, HTN, HLD, anixety, allergies    MEDS: 5.6 eating less & better working from home, Dapagliflozin 10 qd, metformin 1 g BID. Denies vaginal discharge.   Eye evaluation   Urine microalbumin neg  Denies peripheral neuropathy symptoms    Lexapro works well for anxiety, more stress w working from home    BP nml w lisinopril 10    Types for work, nodule R distal finger, some swelling. Some numbness, feels like carpal , some trigger fingers L thumb & L pointer    Denies any chest pain, shortness of breath, nausea vomiting constipation diarrhea, blood in stool, heartburn    Current Outpatient Medications   Medication Instructions    atorvastatin (LIPITOR) 10 mg, Oral, Nightly    blood sugar diagnostic (BLOOD GLUCOSE TEST) Strp 1 strip, Misc.(Non-Drug; Combo Route), 2 times daily before meals    blood-glucose meter kit 1 each, Other, Daily, Use as instructed    dapagliflozin (FARXIGA) 10 mg tablet Take one tablet by mouth daily    erythromycin 500 mg TbEC 1 tablet, Oral, Every 6 hours    EScitalopram oxalate (LEXAPRO) 20 MG tablet TAKE 1 TABLET(20 MG) BY MOUTH EVERY DAY    lancets (ACTI-AROLDO LANCETS) 28 gauge Misc 1 lancet, Subcutaneous, 2 times daily before meals    lancets Misc Test bid    lisinopriL 10 mg, Oral, Daily    metFORMIN (GLUCOPHAGE) 1,000 mg, Oral, 2 times daily with meals       Lab Results   Component Value Date    HGBA1C 5.6 02/08/2023    HGBA1C 5.8 (H) 08/10/2022    HGBA1C 6.3 (H) 02/10/2022     Lab Results   Component Value Date    MICALBCREAT 9 02/08/2023     Lab Results   Component Value Date    LDLCALC 55 08/10/2022    LDLCALC 84 02/10/2022    CHOL 135 08/10/2022    HDL 65 08/10/2022    TRIG 71 08/10/2022       Lab Results   Component Value Date     02/08/2023    K 4.0 02/08/2023     02/08/2023    CO2 26 02/08/2023     (H) 02/08/2023     BUN 10 02/08/2023    CREATININE 0.53 02/08/2023    CALCIUM 9.2 02/08/2023    PROT 6.8 02/08/2023    ALBUMIN 4.6 02/08/2023    BILITOT 0.5 02/08/2023    ALKPHOS 50 (L) 11/10/2016    AST 13 02/08/2023    ALT 13 02/08/2023    ANIONGAP 9 11/10/2016    ESTGFRAFRICA 123 02/10/2022    EGFRNONAA 106 02/10/2022    WBC 4.7 02/08/2023    HGB 13.5 02/08/2023    HGB 12.5 11/10/2016    HCT 40.6 02/08/2023    MCV 89.2 02/08/2023     02/08/2023    TSH 1.66 08/10/2022    HEPCAB Negative 08/02/2016       No results found for: LH, FSH, TOTALTESTOST, PROGESTERONE, ESTRADIOL, [id number], HILFSRCA27, FERRITIN, IRON, TRANSFERRIN, TIBC, FESATURATED, ZINC      Past Medical History:   Diagnosis Date    Abnormal CT scan, chest 03/14/2017    At [facility], subcarinal 3 cm appears to be left lobe thyroid MRI July 2017 [facility], Dr Haynes    Abnormal Pap smear of cervix     Anxiety 10/9/2013    Lexapro works well    Colon polyp     2016, repeat before 2021    Hot thyroid nodule 2009    Dr Haynes    Hyperlipidemia associated with type 2 diabetes mellitus 10/9/2013    Hypertension associated with diabetes 10/9/2013    Menopause     Seasonal allergies 2/16/2023    Trigger finger of left thumb 2/16/2023    Uncontrolled type 2 diabetes mellitus with hyperglycemia, without long-term current use of insulin 03/14/2017    [facility] q yr    Vaginal Pap smear 02/25/2013    Pap/Hpv negative  (Dr Garrison, [facility])      Past Surgical History:   Procedure Laterality Date    HYSTERECTOMY  2007    BARTOLOME/BSO for fibroids    NECK LESION BIOPSY  2017    Neck needle Bx -- Thyroid Nodule     THYROIDECTOMY, PARTIAL  2010    [facility]     Social History     Social History Narrative    Works with insurance, 4 cats, No children, nonsmoker, ETOH rarely, GYN Bone, colon polyp 2016, repeat before 2021     Family History   Problem Relation Age of Onset    Heart attack Father     Cervical cancer Mother     Diabetes Maternal Grandmother     Hyperlipidemia  "Brother     Obesity Brother     Colon cancer Neg Hx     Breast cancer Neg Hx      Vitals:    02/16/23 1005   BP: 120/70   Pulse: (!) 57   Temp: 98.1 °F (36.7 °C)   TempSrc: Oral   SpO2: 98%   Weight: 79.3 kg (174 lb 13.2 oz)   Height: 5' 2" (1.575 m)   PainSc: 0-No pain     Objective:   Physical Exam  Constitutional:       Appearance: She is well-developed.   HENT:      Head: Normocephalic and atraumatic.      Right Ear: External ear normal.      Left Ear: External ear normal.      Nose: Nose normal.      Mouth/Throat:      Mouth: Mucous membranes are moist.      Pharynx: Oropharynx is clear.   Eyes:      Pupils: Pupils are equal, round, and reactive to light.   Neck:      Thyroid: No thyromegaly.   Cardiovascular:      Rate and Rhythm: Normal rate and regular rhythm.      Heart sounds: Normal heart sounds. No murmur heard.  Pulmonary:      Effort: Pulmonary effort is normal.      Breath sounds: Normal breath sounds. No wheezing.   Abdominal:      General: Bowel sounds are normal. There is no distension.      Palpations: Abdomen is soft. There is no mass.      Tenderness: There is no abdominal tenderness. There is no guarding or rebound.   Musculoskeletal:      Cervical back: Neck supple.      Right foot: Normal range of motion. No deformity.      Left foot: Normal range of motion. No deformity.      Comments: Nodule distal Right DIP     Feet:      Right foot:      Protective Sensation: 5 sites tested.  5 sites sensed.      Skin integrity: No ulcer, blister, skin breakdown, erythema or warmth.      Left foot:      Protective Sensation: 5 sites tested.  5 sites sensed.      Skin integrity: No ulcer, blister, skin breakdown, erythema or warmth.   Lymphadenopathy:      Cervical: No cervical adenopathy.   Skin:     General: Skin is warm and dry.   Neurological:      Mental Status: She is alert and oriented to person, place, and time.   Psychiatric:         Behavior: Behavior normal.     Assessment:     1. Routine " general medical examination at a health care facility    2. Controlled type 2 diabetes mellitus without complication, without long-term current use of insulin    3. Hypertension associated with diabetes    4. Hyperlipidemia associated with type 2 diabetes mellitus    5. Encounter for comprehensive diabetic foot examination, type 2 diabetes mellitus    6. Weight loss    7. Seasonal allergies    8. Trigger finger of left thumb      Plan:     Orders Placed This Encounter    Hemoglobin A1C     She declines seeing hand doctor at main campus, she may call hand doctor at [facility] for appt.   Continue medications    ==================================  SEE me in  6 MONTHS with  BLOODWORK one week PRIOR  ===================================  Your 1# medicine is  EXERCISE  - walk just 10  MINUTES EVERY DAY to wake up your metabolism to burn off the calories you plan to eat  YOU CAN prevent future heart attack, stroke, neuropathy (painin hands & feet, foot infection and amputation), nephropathy ( kidney dialysis), and blindness   See your EYE DOCTOR YEARLY    Always wear protective SHOES  Focus on NO SUGAR IN YOUR DRINKS  Decrease & try to stop ALCOHOL, WHITE BREAD, WHITE PASTA, WHITE RICE , WHITE POTATOES- which all turn into sugar.  EAT an EXTRA VEGETABLE A DAY than you already do.    ----------------------------    Follow with GYN for female health & cancer prevention  =========================================================    Your #1 MEDICINE is 10 minutes of WALKING EVERY DAY to wake up your metabolism to burn off the food you eat  Respect the #1 cause of death- cardiovascular disease (HEART ATTACK & STROKE). Keep normal blood pressure, cholesterol, sugars, & quit smoking.     VACCINES: FLU yearly, PNEUMONIA at 65,  SHINGLES at 50  COLON CANCER screening colonoscopy starting at 46 yo  Eat more food grown from the earth (picked from trees or out of the ground)    Follow up yearly with LABS ONE WEEK PRIOR so we can discuss at  your visit    There are no Patient Instructions on file for this visit.

## 2023-03-22 DIAGNOSIS — Z12.31 OTHER SCREENING MAMMOGRAM: ICD-10-CM

## 2023-03-29 ENCOUNTER — PATIENT MESSAGE (OUTPATIENT)
Dept: ADMINISTRATIVE | Facility: HOSPITAL | Age: 58
End: 2023-03-29
Payer: COMMERCIAL

## 2023-07-13 DIAGNOSIS — E11.59 HYPERTENSION ASSOCIATED WITH DIABETES: ICD-10-CM

## 2023-07-13 DIAGNOSIS — I15.2 HYPERTENSION ASSOCIATED WITH DIABETES: ICD-10-CM

## 2023-07-13 RX ORDER — LISINOPRIL 10 MG/1
TABLET ORAL
Qty: 90 TABLET | Refills: 1 | Status: SHIPPED | OUTPATIENT
Start: 2023-07-13

## 2023-09-05 ENCOUNTER — TELEPHONE (OUTPATIENT)
Dept: INTERNAL MEDICINE | Facility: CLINIC | Age: 58
End: 2023-09-05
Payer: COMMERCIAL

## 2023-09-05 DIAGNOSIS — E11.9 CONTROLLED TYPE 2 DIABETES MELLITUS WITHOUT COMPLICATION, WITHOUT LONG-TERM CURRENT USE OF INSULIN: Primary | ICD-10-CM

## 2023-09-05 NOTE — TELEPHONE ENCOUNTER
----- Message from Rose Meyers sent at 9/5/2023 11:39 AM CDT -----  Contact: [phone]  Pt is requesting that orders for a lipid panel and A1C be sent to [facility]  in [place]. Please call when it has been sent.               Thank you

## 2023-09-05 NOTE — TELEPHONE ENCOUNTER
No problem, let patient know cmp and Hgba1c labs were entered in for [facility] in a.m. starting tomorrow - patient needs to call and schedule or go online to [facility] portal.   Thanks,  Akila

## 2023-09-07 LAB
ALBUMIN SERPL-MCNC: 4.7 G/DL (ref 3.6–5.1)
ALBUMIN/GLOB SERPL: 2 (CALC) (ref 1–2.5)
ALP SERPL-CCNC: 38 U/L (ref 37–153)
ALT SERPL-CCNC: 12 U/L (ref 6–29)
AST SERPL-CCNC: 11 U/L (ref 10–35)
BILIRUB SERPL-MCNC: 0.6 MG/DL (ref 0.2–1.2)
BUN SERPL-MCNC: 15 MG/DL (ref 7–25)
BUN/CREAT SERPL: ABNORMAL (CALC) (ref 6–22)
CALCIUM SERPL-MCNC: 9.5 MG/DL (ref 8.6–10.4)
CHLORIDE SERPL-SCNC: 105 MMOL/L (ref 98–110)
CO2 SERPL-SCNC: 26 MMOL/L (ref 20–32)
CREAT SERPL-MCNC: 0.6 MG/DL (ref 0.5–1.03)
EGFR: 103 ML/MIN/1.73M2
GLOBULIN SER CALC-MCNC: 2.3 G/DL (CALC) (ref 1.9–3.7)
GLUCOSE SERPL-MCNC: 119 MG/DL (ref 65–99)
HBA1C MFR BLD: 5.6 % OF TOTAL HGB
POTASSIUM SERPL-SCNC: 4.2 MMOL/L (ref 3.5–5.3)
PROT SERPL-MCNC: 7 G/DL (ref 6.1–8.1)
SODIUM SERPL-SCNC: 139 MMOL/L (ref 135–146)

## 2023-11-13 DIAGNOSIS — E11.9 CONTROLLED TYPE 2 DIABETES MELLITUS WITHOUT COMPLICATION, WITHOUT LONG-TERM CURRENT USE OF INSULIN: ICD-10-CM

## 2023-11-13 NOTE — TELEPHONE ENCOUNTER
"Lf-8/24/22  Lov-8/24/2022    Scheduled 11/20/23 , pt was upset that's he had to schedule an appointment , she states "all my labs are good so why do I need to see her " its been over a year so I scheduled her for a virtual .  "

## 2023-11-14 RX ORDER — DAPAGLIFLOZIN 10 MG/1
TABLET, FILM COATED ORAL
Qty: 90 TABLET | Refills: 0 | Status: SHIPPED | OUTPATIENT
Start: 2023-11-14 | End: 2024-02-09 | Stop reason: SDUPTHER

## 2023-11-20 ENCOUNTER — OFFICE VISIT (OUTPATIENT)
Dept: INTERNAL MEDICINE | Facility: CLINIC | Age: 58
End: 2023-11-20
Payer: COMMERCIAL

## 2023-11-20 VITALS — BODY MASS INDEX: 31.09 KG/M2 | WEIGHT: 170 LBS

## 2023-11-20 DIAGNOSIS — E11.59 HYPERTENSION ASSOCIATED WITH DIABETES: ICD-10-CM

## 2023-11-20 DIAGNOSIS — E78.5 HYPERLIPIDEMIA ASSOCIATED WITH TYPE 2 DIABETES MELLITUS: ICD-10-CM

## 2023-11-20 DIAGNOSIS — E11.9 CONTROLLED TYPE 2 DIABETES MELLITUS WITHOUT COMPLICATION, WITHOUT LONG-TERM CURRENT USE OF INSULIN: Primary | ICD-10-CM

## 2023-11-20 DIAGNOSIS — I15.2 HYPERTENSION ASSOCIATED WITH DIABETES: ICD-10-CM

## 2023-11-20 DIAGNOSIS — E11.69 HYPERLIPIDEMIA ASSOCIATED WITH TYPE 2 DIABETES MELLITUS: ICD-10-CM

## 2023-11-20 PROBLEM — E66.09 OBESITY DUE TO EXCESS CALORIES WITH SERIOUS COMORBIDITY: Status: ACTIVE | Noted: 2023-11-20

## 2023-11-20 PROCEDURE — 1160F RVW MEDS BY RX/DR IN RCRD: CPT | Mod: CPTII,95,, | Performed by: NURSE PRACTITIONER

## 2023-11-20 PROCEDURE — 4010F ACE/ARB THERAPY RXD/TAKEN: CPT | Mod: CPTII,95,, | Performed by: NURSE PRACTITIONER

## 2023-11-20 PROCEDURE — 3044F HG A1C LEVEL LT 7.0%: CPT | Mod: CPTII,95,, | Performed by: NURSE PRACTITIONER

## 2023-11-20 PROCEDURE — 1159F MED LIST DOCD IN RCRD: CPT | Mod: CPTII,95,, | Performed by: NURSE PRACTITIONER

## 2023-11-20 PROCEDURE — 99214 PR OFFICE/OUTPT VISIT, EST, LEVL IV, 30-39 MIN: ICD-10-PCS | Mod: 95,,, | Performed by: NURSE PRACTITIONER

## 2023-11-20 PROCEDURE — 3066F PR DOCUMENTATION OF TREATMENT FOR NEPHROPATHY: ICD-10-PCS | Mod: CPTII,95,, | Performed by: NURSE PRACTITIONER

## 2023-11-20 PROCEDURE — 3008F PR BODY MASS INDEX (BMI) DOCUMENTED: ICD-10-PCS | Mod: CPTII,95,, | Performed by: NURSE PRACTITIONER

## 2023-11-20 PROCEDURE — 3061F PR NEG MICROALBUMINURIA RESULT DOCUMENTED/REVIEW: ICD-10-PCS | Mod: CPTII,95,, | Performed by: NURSE PRACTITIONER

## 2023-11-20 PROCEDURE — 3008F BODY MASS INDEX DOCD: CPT | Mod: CPTII,95,, | Performed by: NURSE PRACTITIONER

## 2023-11-20 PROCEDURE — 1159F PR MEDICATION LIST DOCUMENTED IN MEDICAL RECORD: ICD-10-PCS | Mod: CPTII,95,, | Performed by: NURSE PRACTITIONER

## 2023-11-20 PROCEDURE — 4010F PR ACE/ARB THEARPY RXD/TAKEN: ICD-10-PCS | Mod: CPTII,95,, | Performed by: NURSE PRACTITIONER

## 2023-11-20 PROCEDURE — 3061F NEG MICROALBUMINURIA REV: CPT | Mod: CPTII,95,, | Performed by: NURSE PRACTITIONER

## 2023-11-20 PROCEDURE — 3044F PR MOST RECENT HEMOGLOBIN A1C LEVEL <7.0%: ICD-10-PCS | Mod: CPTII,95,, | Performed by: NURSE PRACTITIONER

## 2023-11-20 PROCEDURE — 99214 OFFICE O/P EST MOD 30 MIN: CPT | Mod: 95,,, | Performed by: NURSE PRACTITIONER

## 2023-11-20 PROCEDURE — 1160F PR REVIEW ALL MEDS BY PRESCRIBER/CLIN PHARMACIST DOCUMENTED: ICD-10-PCS | Mod: CPTII,95,, | Performed by: NURSE PRACTITIONER

## 2023-11-20 PROCEDURE — 3066F NEPHROPATHY DOC TX: CPT | Mod: CPTII,95,, | Performed by: NURSE PRACTITIONER

## 2023-11-20 RX ORDER — TIRZEPATIDE 2.5 MG/.5ML
2.5 INJECTION, SOLUTION SUBCUTANEOUS
Qty: 4 PEN | Refills: 3 | Status: SHIPPED | OUTPATIENT
Start: 2023-11-20 | End: 2023-11-27

## 2023-11-20 NOTE — PATIENT INSTRUCTIONS
You can try mounjaro injection once per week - start at the 2.5mg every week-   When you start this medication - stop taking the metformin and farxiga (that would be too much medication for you).     The mounjaro would decrease your appetite and hopefully help with weight loss - it can be titrated up to higher doses each month as tolerated.   Eat small frequent meals, avoid processed foods -     To avoid symptoms of GI upset - I find limiting foods such as these below can often help with nausea or diarrhea when taking GLP1 or gip1 medications:    Seed oils (canola, vegetable oils),   Milk products,   Fried foods or overly processed foods (sweets/cakes/cookies).   Or nightshades - veggies/fruits with the dark colored skins - (tomatoe, eggplant, dark grapes in excess)      Let me know if any nausea/vomiting or abdominal pain.

## 2023-11-20 NOTE — PROGRESS NOTES
The patient location is: at home.   The chief complaint leading to consultation is: T2dm     Visit type: audiovisual    Face to Face time with patient: 30 minutes  40 minutes of total time spent on the encounter, which includes face to face time and non-face to face time preparing to see the patient (eg, review of tests), Obtaining and/or reviewing separately obtained history, Documenting clinical information in the electronic or other health record, Independently interpreting results (not separately reported) and communicating results to the patient/family/caregiver, or Care coordination (not separately reported).     Each patient to whom he or she provides medical services by telemedicine is:  (1) informed of the relationship between the physician and patient and the respective role of any other health care provider with respect to management of the patient; and (2) notified that he or she may decline to receive medical services by telemedicine and may withdraw from such care at any time.    HPI for today -   58 y.o. female here for routine follow up visit for management of T2dm -   Last seen August 2022 - those notes below.     A1c remains in non-diabetic range at 5.6% -   She is on metformin 1000mg twice per day and farxiga 10mg tablet daily.   No side effects.   Current weight is at 170 lbs.   She had lost weight within the past 2 years - but no further.   Admits doesn't eat out/follows fresh diet- veggies, primarily home cooked meals.   She is not privy to needles/injections - we had not ever discussed glp1 therapies prior to this visit.     Is not checking blood sugar regularly .  No new lipid panel done - she is on lipitor every night.   Takes lisinopril 10mg every morning for blood pressure.   Lexapro every morning for depression.   No acute complaints today's visit.         Notes for today's visit:     56 y.o. female here for 6 month follow up visit for management of T2dm -   Last seen February 2022 -  those notes below.     a1c continues to be well controlled- now down from 6.3% to 5.8%.   She has continued to lose weight.   Now down from 220 lbs --> 181 lbs, now to 167 lbs.   Her goal is 150 lbs - right now.   She has lost 50 + lbs with lifestyle and diet changes.      Continues on metformin 1000 mg twice per day    and farxiga 10 mg tablet daily in morning time.   Denies any known side effects from either medication -   Not checking blood sugars routinely - her glucometer battery went out - so she requests new meter with testing supplies.     Her tsh and free t4 is normal on labs.   S/p partial thyroidectomy in the past 2 to nodules.   Goes to her ENT to follow up with her thyroid and get ultrasound done per routine to monitor the area.     HTN: lisinopril/hctz 10/12.5mg tablet daily for her blood pressure.   She has noticed her bp running on lower end 110/70's.   She is feeling well.  Her lipids are at goal and she continues on statin daily.      No acute complaints today's visit.         Last visit from February 2022:   56 y.o. female, here for 1 year check up for management of T2dm -   Last seen for initial consult in January 2021 - was a virtual visit - those notes below -     a1c reduced from 7.3% to 6.3%.   She has lost weight - back to normal pants size.   She had gained weight at onset of pandemic, now much improved.   Thinks she was around 220 lbs and is now 181 lbs today.   Now is eating healthier -   Exercising some now - yard work and walking.   Working from home now but is fully acclimated to this/enjoys.     History of partial thyroidectomy - has never needed thyroid hormone. Usually follows with ENT regularly.   BP controlled - on lisinopril/hctz.   Cholesterol at goal - on lipitor every Hs.   For her diabetes:   She continues on metformin 1000mg twice per day.   Last year after our initial consult, I switched her from glipizide to farxiga daily.   She began and helped, increased dose from 5 to  10mg - she is taking.   Tolerating well and no side effects.   No acute complaints today's visit.       Last visit notes from January 2021: (audiovisual visit).   HPI: Yamileth Wong is a 58 y.o.  female c/I for visit to address Diabetes Type 2  This is the first time I am seeing her for care, follows with Nicole Saavedra MD for primary care needs.   Has not seen endocrinology or diabetes specialist in the past.     was diagnosed with T2DM about 25 years ago.   Brother also has T2dm, sister also had Diabetes borderline - and now passed away.   Has never been hospitalized r/t DM.  Denies missing doses of DM medication.   She has been on metformin 1000mg twice per day   And taking glipizide 10mg daily.     a1c has slowly gone up -   Last year was @ 5.8% and is now up to currently 7.3%.   Reports has struggled on and off with her weight - weight gain and weight loss.   Recently 25 lbs weight gain this year  - relates mostly to change in lifestyle and covid pandemic.   Has now been working at home.     Complications from diabetes -   Denies nephropathy.   Denies Diabetic retinopathy.   + history of HLD and HTN.   Denies CV disease.     No acute complaints today's visit.   She is interested in tips and other medications that could hopefully manage her diabetes better than she is doing now.   She is motivated to start eating better and working on eliminating some carbs from her diet.     Past medical History:   Past Medical History:   Diagnosis Date    Abnormal CT scan, chest 03/14/2017    At [facility], subcarinal 3 cm appears to be left lobe thyroid MRI July 2017 [facility], Dr Haynes    Abnormal Pap smear of cervix     Anxiety 10/9/2013    Lexapro works well    Colon polyp     2016, repeat before 2021    Hot thyroid nodule 2009    Dr Haynes    Hyperlipidemia associated with type 2 diabetes mellitus 10/9/2013    Hypertension associated with diabetes 10/9/2013    Menopause     Seasonal allergies 2/16/2023     Trigger finger of left thumb 2/16/2023    Uncontrolled type 2 diabetes mellitus with hyperglycemia, without long-term current use of insulin 03/14/2017    [facility] q yr    Vaginal Pap smear 02/25/2013    [place]  [place] [facility]       Family hx:   Family History   Problem Relation Age of Onset    Heart attack Father     Cervical cancer Mother     Diabetes Maternal Grandmother     Hyperlipidemia Brother     Obesity Brother     Colon cancer Neg Hx     Breast cancer Neg Hx       Current meds:   Current Outpatient Medications:     atorvastatin (LIPITOR) 10 MG tablet, TAKE 1 TABLET(10 MG) BY MOUTH EVERY EVENING, Disp: 90 tablet, Rfl: 3    blood sugar diagnostic (BLOOD GLUCOSE TEST) Strp, 1 strip by Misc.(Non-Drug; Combo Route) route 2 (two) times daily before meals., Disp: 100 strip, Rfl: 6    blood-glucose meter kit, 1 each by Other route once daily. Use as instructed, Disp: 1 each, Rfl: 0    dapagliflozin propanediol (FARXIGA) 10 mg tablet, Take one tablet by mouth daily, Disp: 90 tablet, Rfl: 0    erythromycin 500 mg TbEC, Take 1 tablet by mouth every 6 (six) hours., Disp: , Rfl:     EScitalopram oxalate (LEXAPRO) 20 MG tablet, TAKE 1 TABLET(20 MG) BY MOUTH EVERY DAY, Disp: 90 tablet, Rfl: 3    lancets (ACTI-AROLDO LANCETS) 28 gauge Misc, Inject 1 lancet into the skin 2 (two) times daily before meals., Disp: 100 each, Rfl: 6    lancets Misc, Test bid, Disp: 100 each, Rfl: 3    lisinopriL 10 MG tablet, TAKE 1 TABLET(10 MG) BY MOUTH EVERY DAY, Disp: 90 tablet, Rfl: 1    metFORMIN (GLUCOPHAGE) 500 MG tablet, TAKE 2 TABLETS(1000 MG) BY MOUTH TWICE DAILY WITH MEALS, Disp: 360 tablet, Rfl: 3    tirzepatide (MOUNJARO) 2.5 mg/0.5 mL PnIj, Inject 2.5 mg into the skin every 7 days., Disp: 4 pen , Rfl: 3     Current Diabetes medications:   Metformin 500mg - 2 tablets po bid.   farxiga 10mg tablet daily.     Medications Tried and Failed:   Glipizide 10mg tablet daily.     Review of Pertinent  co-morbidities/risk factors:   CV: Denies history of MI nor stroke.   CAD: Denies.  Does not take aspirin daily.   BP: has history of HTN  Statin: Taking  ACE/ARB: Taking    Social History     Tobacco Use   Smoking Status Never   Smokeless Tobacco Never        Social:   Lives at home with long term male partner  Life changes/stressors currently: working from home/change in lifestyle - less social contact.   Diet: following ADA diet   Meals: 3 per day and small amount of snacks only.        Breakfast - protein bar - something quick       Lunch - turkey sandiwch.        Dinner - beans and rice, roast, green beans and carrots and turkey sausage       Snacks - none        Drinks -coffee and water. Diet cranberry juice.diet snapple.  Exercise: none.   Activities: working full time from home - working long hours. 12 hour days.     Glucose Monitoring:   Does not check sugars.      Standards of care:   Eyes: .: 05/25/2022  Foot exam: : 02/16/2023   Diabetes education: None.    Vital Signs  Wt 77.1 kg (170 lb)   LMP  (LMP Unknown) Comment: BARTOLOME/BSO  2007  BMI 31.09 kg/m²     Pertinent Labs:   Hgba1c   Lab Results   Component Value Date    HGBA1C 5.6 09/06/2023    HGBA1C 5.6 02/08/2023    HGBA1C 5.8 (H) 08/10/2022     Lipid panel   Lab Results   Component Value Date    CHOL 135 08/10/2022    CHOL 166 02/10/2022    CHOL 174 04/26/2021     Lab Results   Component Value Date    HDL 65 08/10/2022    HDL 62 02/10/2022    HDL 64 04/26/2021     Lab Results   Component Value Date    LDLCALC 55 08/10/2022    LDLCALC 84 02/10/2022    LDLCALC 89 04/26/2021     Lab Results   Component Value Date    TRIG 71 08/10/2022    TRIG 104 02/10/2022    TRIG 117 04/26/2021     Lab Results   Component Value Date    CHOLHDL 2.1 08/10/2022    CHOLHDL 2.7 02/10/2022    CHOLHDL 2.7 04/26/2021      CMP  Glucose   Date Value Ref Range Status   09/06/2023 119 (H) 65 - 99 mg/dL Final     Comment:                   Fasting reference interval     For someone  without known diabetes, a glucose value  between 100 and 125 mg/dL is consistent with  prediabetes and should be confirmed with a  follow-up test.          BUN   Date Value Ref Range Status   09/06/2023 15 7 - 25 mg/dL Final     Creatinine   Date Value Ref Range Status   09/06/2023 0.60 0.50 - 1.03 mg/dL Final     eGFR if    Date Value Ref Range Status   02/10/2022 123 > OR = 60 mL/min/1.73m2 Final     eGFR if non    Date Value Ref Range Status   02/10/2022 106 > OR = 60 mL/min/1.73m2 Final     AST   Date Value Ref Range Status   09/06/2023 11 10 - 35 U/L Final     ALT   Date Value Ref Range Status   09/06/2023 12 6 - 29 U/L Final     Microalbumin creatinine ratio:   Lab Results   Component Value Date    MICALBCREAT 9 02/08/2023       Review Of Systems:   Gen: Appetite good, 50 + lbs weight loss, denies fatigue, Denies polydipsia.  Skin: Skin is intact and heals well, denies any rashes or hair changes.   EENT: Denies any acute visual disturbances, nor blurred vision.   History of partial thyroidectomy and some thyroid nodules.   Resp: Denies SOB or Dyspnea on exertion, denies cough.   Cardiac: Denies chest pain, palpitations, or swelling.   GI: Denies abdominal pain, nausea or vomiting, diarrhea, or constipation.   /GYN: Denies nocturia, nor burning, frequency or pain on urination.  MS/Neuro: Denies numbness/ tingling in BLE; Gait steady, speech clear  Psych: Denies drug/ETOH abuse, hx depression but no acute flares controlled.   Other systems: negative.    Physical Exam - limited as this was a virtual visit:     Constitutional:       Appearance: Normal appearance.   HENT:      Head: Normocephalic.      Nose: Nose normal.      Eyes:      Extraocular Movements: Extraocular movements intact.      Pulmonary:      Effort: Pulmonary effort is normal.      Neurological: General: No focal deficit present.      Mental Status: She is alert and oriented to person, place, and time.    Psychiatric:         Mood and Affect: Mood normal.         Behavior: Behavior normal.         Thought Content: Thought content normal.         Judgment: Judgment normal.       Assessment and Plan of Care:     Diagnoses and all orders for this visit:    Controlled type 2 diabetes mellitus without complication, without long-term current use of insulin  -     tirzepatide (MOUNJARO) 2.5 mg/0.5 mL PnIj; Inject 2.5 mg into the skin every 7 days.  -     Hemoglobin A1C; Future  -     Microalbumin/Creatinine Ratio, Urine; Future  -     Comprehensive Metabolic Panel; Future    Hyperlipidemia associated with type 2 diabetes mellitus  -     Lipid Panel; Future    Hypertension associated with diabetes     1. T2DM with hyperglycemia- Hgba1c 5.8%---> 6.8%---> 7.3%- 6.3% --> 5.8% ---> 5.6% -   discussed DM, progression of disease, long term complications, CV risk factors and tx options.   Lets try changing from the metformin and farxiga tablets to mounjaro once per week injection instead.   Advise compliance with ADA diet and encourage exercise  [facility] - [facility] - needs recheck.   Goes to outside.     2. HTN controlled, continue meds as previously prescribed and monitor.   On lisinopril and HCTZ - urine mac - not able to calculate - last done in 2019.   Will repeat with next labs.   bp had been on lower end now - she has lost almost 50 lbs.   I changed her lisinopril/hctz to just plain lisinopril 10 daily - and she continues to take.   And explained to her as she is losing weight, her bp is likely coming down nicely as well.     3. HLD - LDL goal < 100. Meets goal   Currently on statin therapy- continue 10mg every HS.     4. Weight - BMI Body mass index is 31.09 kg/m².   Encourage Ada diet and exercise.   Continues on sglt2i.  - she is going to try mounjaro instead of the farxiga     5. Renal Function - stable. No history of nephropathy. Will continue to monitor.     6. Hypothyroid - s/p  partial thyroidectomy from hyperthyroid/nodules - removed.  Has not needing thyroid hormone replacement. Defer to pcp for management.   tsh and free t4 are wnl.          Follow up in 3 months with OV and labs.

## 2023-11-27 ENCOUNTER — PATIENT MESSAGE (OUTPATIENT)
Dept: INTERNAL MEDICINE | Facility: CLINIC | Age: 58
End: 2023-11-27
Payer: COMMERCIAL

## 2024-02-09 DIAGNOSIS — E11.9 CONTROLLED TYPE 2 DIABETES MELLITUS WITHOUT COMPLICATION, WITHOUT LONG-TERM CURRENT USE OF INSULIN: Primary | ICD-10-CM

## 2024-02-12 RX ORDER — DAPAGLIFLOZIN 10 MG/1
TABLET, FILM COATED ORAL
Qty: 90 TABLET | Refills: 0 | Status: SHIPPED | OUTPATIENT
Start: 2024-02-12 | End: 2024-05-12

## 2024-02-12 NOTE — TELEPHONE ENCOUNTER
Sent in/approved refill for farxiga 10mg tablet to [facility].   Can you schedule her for OV and labs in 3 months?   She is fine for me virtual if she prefers as long as we have blood work.     See prior messages, looks like she cancelled her future visit and labs with me, so not sure if she's planning on having someone else take over meds.   But we just need something this year/bloodwork/recheck on file If I'm going to continue to prescribe medications for her diabetes.   Thanks,  Akila

## 2024-03-26 ENCOUNTER — PATIENT MESSAGE (OUTPATIENT)
Dept: OBSTETRICS AND GYNECOLOGY | Facility: CLINIC | Age: 59
End: 2024-03-26
Payer: COMMERCIAL

## 2024-04-04 DIAGNOSIS — E11.65 UNCONTROLLED TYPE 2 DIABETES MELLITUS WITH HYPERGLYCEMIA, WITHOUT LONG-TERM CURRENT USE OF INSULIN: ICD-10-CM

## 2024-04-04 DIAGNOSIS — Z00.00 ROUTINE GENERAL MEDICAL EXAMINATION AT A HEALTH CARE FACILITY: Primary | ICD-10-CM

## 2024-04-04 RX ORDER — METFORMIN HYDROCHLORIDE 500 MG/1
TABLET ORAL
Qty: 360 TABLET | Refills: 0 | Status: SHIPPED | OUTPATIENT
Start: 2024-04-04

## 2024-04-04 RX ORDER — ATORVASTATIN CALCIUM 10 MG/1
TABLET, FILM COATED ORAL
Qty: 90 TABLET | Refills: 0 | Status: SHIPPED | OUTPATIENT
Start: 2024-04-04

## 2024-04-04 NOTE — TELEPHONE ENCOUNTER
Care Due:                  Date            Visit Type   Department     Provider  --------------------------------------------------------------------------------                                EP -                              PRIMARY      [facility]  Last Visit: 02-      CARE ([facility])   CARE           Nicole Dickerson  Next Visit: None Scheduled  None         None Found                                                            Last  Test          Frequency    Reason                     Performed    Due Date  --------------------------------------------------------------------------------    Office Visit  15 months..  EScitalopram, lisinopriL.  02- 05-    [facility] Embedded Care Due Messages. Reference number: [id number].   4/04/2024 5:21:18 AM CDT

## 2024-04-05 NOTE — TELEPHONE ENCOUNTER
Refill Routing Note   Medication(s) are not appropriate for processing by [facility] for the following reason(s):        Drug-drug interaction    [facility] action(s):  Defer   Requires appointment : Yes        Medication Therapy Plan: erythromycin and EScitalopram oxalate    Pharmacist review requested: Yes     Appointments  past 12m or future 3m with PCP    Date Provider   Last Visit   2/16/2023 Nicole Dickerson MD   Next Visit   Visit date not found Nicole Dickerson MD   ED visits in past 90 days: 0        Note composed:3:47 PM 04/05/2024

## 2024-04-09 RX ORDER — ESCITALOPRAM OXALATE 20 MG/1
TABLET ORAL
Qty: 90 TABLET | Refills: 0 | Status: SHIPPED | OUTPATIENT
Start: 2024-04-09

## 2024-04-12 ENCOUNTER — PATIENT MESSAGE (OUTPATIENT)
Dept: PRIMARY CARE CLINIC | Facility: CLINIC | Age: 59
End: 2024-04-12
Payer: COMMERCIAL

## 2024-04-12 DIAGNOSIS — E11.59 HYPERTENSION ASSOCIATED WITH DIABETES: ICD-10-CM

## 2024-04-12 DIAGNOSIS — I15.2 HYPERTENSION ASSOCIATED WITH DIABETES: ICD-10-CM

## 2024-04-12 LAB
ALBUMIN SERPL-MCNC: 4.5 G/DL (ref 3.6–5.1)
ALBUMIN/GLOB SERPL: 2.1 (CALC) (ref 1–2.5)
ALP SERPL-CCNC: 39 U/L (ref 37–153)
ALT SERPL-CCNC: 15 U/L (ref 6–29)
AST SERPL-CCNC: 15 U/L (ref 10–35)
BILIRUB SERPL-MCNC: 0.6 MG/DL (ref 0.2–1.2)
BUN SERPL-MCNC: 15 MG/DL (ref 7–25)
BUN/CREAT SERPL: ABNORMAL (CALC) (ref 6–22)
CALCIUM SERPL-MCNC: 9.3 MG/DL (ref 8.6–10.4)
CHLORIDE SERPL-SCNC: 101 MMOL/L (ref 98–110)
CHOLEST SERPL-MCNC: 163 MG/DL
CHOLEST/HDLC SERPL: 2.1 (CALC)
CO2 SERPL-SCNC: 26 MMOL/L (ref 20–32)
CREAT SERPL-MCNC: 0.52 MG/DL (ref 0.5–1.05)
EGFR: 106 ML/MIN/1.73M2
ERYTHROCYTE [DISTWIDTH] IN BLOOD BY AUTOMATED COUNT: 12.3 % (ref 11–15)
GLOBULIN SER CALC-MCNC: 2.1 G/DL (CALC) (ref 1.9–3.7)
GLUCOSE SERPL-MCNC: 136 MG/DL (ref 65–99)
HBA1C MFR BLD: 6.1 % OF TOTAL HGB
HCT VFR BLD AUTO: 39.6 % (ref 35–45)
HDLC SERPL-MCNC: 77 MG/DL
HGB BLD-MCNC: 13.3 G/DL (ref 11.7–15.5)
LDLC SERPL CALC-MCNC: 67 MG/DL (CALC)
MCH RBC QN AUTO: 29.9 PG (ref 27–33)
MCHC RBC AUTO-ENTMCNC: 33.6 G/DL (ref 32–36)
MCV RBC AUTO: 89 FL (ref 80–100)
NONHDLC SERPL-MCNC: 86 MG/DL (CALC)
PLATELET # BLD AUTO: 208 THOUSAND/UL (ref 140–400)
PMV BLD REES-ECKER: 11.7 FL (ref 7.5–12.5)
POTASSIUM SERPL-SCNC: 4.1 MMOL/L (ref 3.5–5.3)
PROT SERPL-MCNC: 6.6 G/DL (ref 6.1–8.1)
RBC # BLD AUTO: 4.45 MILLION/UL (ref 3.8–5.1)
SODIUM SERPL-SCNC: 137 MMOL/L (ref 135–146)
TRIGL SERPL-MCNC: 111 MG/DL
WBC # BLD AUTO: 5 THOUSAND/UL (ref 3.8–10.8)

## 2024-04-12 NOTE — TELEPHONE ENCOUNTER
No care due was identified.  [facility] Embedded Care Due Messages. Reference number: [id number].   4/12/2024 9:45:08 AM CDT

## 2024-04-12 NOTE — TELEPHONE ENCOUNTER
No care due was identified.  [facility] Embedded Care Due Messages. Reference number: [id number].   4/12/2024 3:39:20 PM CDT

## 2024-04-12 NOTE — TELEPHONE ENCOUNTER
----- Message from CALEEdie Gray sent at 4/12/2024  9:31 AM CDT -----  Contact: Self [phone]  Would like to receive medical advice.  [id number] [place] - [address] [facility] [address] [place] [address]  Phone: [phone] Fax: [phone]     Would they like a call back or a response via [facility]: Portal     Additional information:  Calling to check the status of the Rx for lisinopriL 10 MG tablet. Pt states that the pharm has reached out on 4/5 with no response.

## 2024-04-12 NOTE — TELEPHONE ENCOUNTER
Lf-7/13/23  Lov-2/16/2023  Patient comment: I have no refills on this prescription and I need it to be filled today.  The pharmacy said they sent in a request for this medication to be approved for refill on 4/5/24 and have not had a response.  Please refill immediately as I am traveling out of town tomorrow and I need this medication today.  I picked up medication yesterday and thought this one was in my package, but I discovered sadly it was not.  Thank you.

## 2024-04-16 ENCOUNTER — PATIENT OUTREACH (OUTPATIENT)
Dept: ADMINISTRATIVE | Facility: HOSPITAL | Age: 59
End: 2024-04-16
Payer: COMMERCIAL

## 2024-04-16 RX ORDER — LISINOPRIL 10 MG/1
10 TABLET ORAL DAILY
Qty: 90 TABLET | Refills: 0 | Status: SHIPPED | OUTPATIENT
Start: 2024-04-16 | End: 2024-04-30

## 2024-04-16 RX ORDER — LISINOPRIL 10 MG/1
10 TABLET ORAL DAILY
Qty: 90 TABLET | Refills: 0 | Status: SHIPPED | OUTPATIENT
Start: 2024-04-16

## 2024-04-16 RX ORDER — LISINOPRIL 10 MG/1
10 TABLET ORAL DAILY
Qty: 30 TABLET | Refills: 0 | OUTPATIENT
Start: 2024-04-16

## 2024-04-16 NOTE — PROGRESS NOTES
Health Maintenance Due   Topic Date Due    HIV Screening  Never done    Shingles Vaccine (1 of 2) Never done    Pneumococcal Vaccines (Age 0-64) (2 of 2 - PCV) 03/08/2019    Eye Exam  05/25/2023    Influenza Vaccine (1) Never done    COVID-19 Vaccine (3 - 2023-24 season) 09/01/2023    RSV Vaccine (Age 60+ and Pregnant patients) (1 - 1-dose 60+ series) Never done    Diabetes Urine Screening  02/08/2024    Foot Exam  02/16/2024        Chart review done.    updated.   Immunizations reviewed & updated.   Care Everywhere updated.

## 2024-04-19 ENCOUNTER — PATIENT MESSAGE (OUTPATIENT)
Dept: ADMINISTRATIVE | Facility: HOSPITAL | Age: 59
End: 2024-04-19
Payer: COMMERCIAL

## 2024-04-30 ENCOUNTER — OFFICE VISIT (OUTPATIENT)
Dept: PRIMARY CARE CLINIC | Facility: CLINIC | Age: 59
End: 2024-04-30
Payer: COMMERCIAL

## 2024-04-30 VITALS
SYSTOLIC BLOOD PRESSURE: 124 MMHG | HEIGHT: 62 IN | DIASTOLIC BLOOD PRESSURE: 70 MMHG | HEART RATE: 71 BPM | WEIGHT: 181.19 LBS | RESPIRATION RATE: 18 BRPM | OXYGEN SATURATION: 97 % | BODY MASS INDEX: 33.34 KG/M2

## 2024-04-30 DIAGNOSIS — Z00.00 ROUTINE GENERAL MEDICAL EXAMINATION AT A HEALTH CARE FACILITY: Primary | ICD-10-CM

## 2024-04-30 DIAGNOSIS — E11.9 CONTROLLED TYPE 2 DIABETES MELLITUS WITHOUT COMPLICATION, WITHOUT LONG-TERM CURRENT USE OF INSULIN: ICD-10-CM

## 2024-04-30 DIAGNOSIS — I10 BENIGN HYPERTENSION: ICD-10-CM

## 2024-04-30 DIAGNOSIS — E11.9 ENCOUNTER FOR COMPREHENSIVE DIABETIC FOOT EXAMINATION, TYPE 2 DIABETES MELLITUS: ICD-10-CM

## 2024-04-30 LAB
ALBUMIN/CREAT UR: 33.3 UG/MG (ref 0–30)
CREAT UR-MCNC: 33 MG/DL (ref 15–325)
MICROALBUMIN UR DL<=1MG/L-MCNC: 11 UG/ML

## 2024-04-30 PROCEDURE — 3008F BODY MASS INDEX DOCD: CPT | Mod: CPTII,S$GLB,, | Performed by: FAMILY MEDICINE

## 2024-04-30 PROCEDURE — 4010F ACE/ARB THERAPY RXD/TAKEN: CPT | Mod: CPTII,S$GLB,, | Performed by: FAMILY MEDICINE

## 2024-04-30 PROCEDURE — 99999 PR PBB SHADOW E&M-EST. PATIENT-LVL IV: CPT | Mod: PBBFAC,,, | Performed by: FAMILY MEDICINE

## 2024-04-30 PROCEDURE — 3078F DIAST BP <80 MM HG: CPT | Mod: CPTII,S$GLB,, | Performed by: FAMILY MEDICINE

## 2024-04-30 PROCEDURE — 3044F HG A1C LEVEL LT 7.0%: CPT | Mod: CPTII,S$GLB,, | Performed by: FAMILY MEDICINE

## 2024-04-30 PROCEDURE — 3074F SYST BP LT 130 MM HG: CPT | Mod: CPTII,S$GLB,, | Performed by: FAMILY MEDICINE

## 2024-04-30 PROCEDURE — 82043 UR ALBUMIN QUANTITATIVE: CPT | Performed by: FAMILY MEDICINE

## 2024-04-30 PROCEDURE — 1159F MED LIST DOCD IN RCRD: CPT | Mod: CPTII,S$GLB,, | Performed by: FAMILY MEDICINE

## 2024-04-30 PROCEDURE — 99396 PREV VISIT EST AGE 40-64: CPT | Mod: S$GLB,,, | Performed by: FAMILY MEDICINE

## 2024-04-30 PROCEDURE — 1160F RVW MEDS BY RX/DR IN RCRD: CPT | Mod: CPTII,S$GLB,, | Performed by: FAMILY MEDICINE

## 2024-04-30 NOTE — PROGRESS NOTES
Assessment:     1. Routine general medical examination at a health care facility    2. Benign hypertension    3. Controlled type 2 diabetes mellitus without complication, without long-term current use of insulin      Plan:     Routine general medical examination at a health care facility  Follow with GYN for female health & cancer prevention  Move more, High fiber, good fat (avocado, olive oil, nuts) foods  Eat more food grown from the earth (picked from trees or out of the ground)  Colon cancer screening at 44 yo  Follow up yearly with LABS ONE WEEK PRIOR so we can discuss at your visit      Benign hypertension  Stable (goal < 129/79)  Continue Lisinopril 10 daily      Controlled type 2 diabetes mellitus without complication, without long-term current use of insulin  Stable, contineu MEtformin 500, 2 tabs in am & pm  & Farxiga 10 daily    Hga1c in 6 mo  Urine micro today    Will get PNeumovax    HPI: Yamileth Wong is a 58 y.o. female with is here today for general exam.     Here today for diabetes mellitus   Lab Results   Component Value Date    HGBA1C 6.1 (H) 04/11/2024    HGBA1C 5.6 09/06/2023    HGBA1C 5.6 02/08/2023     (H) 04/11/2024    MICALBCREAT 9 02/08/2023    CREATININE 0.52 04/11/2024    ESTGFRAFRICA 123 02/10/2022    EGFRNONAA 106 02/10/2022    LDLCALC 67 04/11/2024       Wt Readings from Last 5 Encounters:   04/30/24 82.2 kg (181 lb 3.5 oz)   11/20/23 77.1 kg (170 lb)   02/16/23 79.3 kg (174 lb 13.2 oz)   08/24/22 75.8 kg (167 lb)   04/11/22 78.3 kg (172 lb 9.9 oz)       MEDS:  Diabetes Medications               dapagliflozin propanediol (FARXIGA) 10 mg tablet Take one tablet by mouth daily    metFORMIN (GLUCOPHAGE) 500 MG tablet TAKE 2 TABLETS(1000 MG) BY MOUTH TWICE DAILY WITH MEALS               Denies chest pain, shortness of breath    Current Outpatient Medications   Medication Instructions    atorvastatin (LIPITOR) 10 MG tablet TAKE 1 TABLET(10 MG) BY MOUTH EVERY EVENING     "blood sugar diagnostic (BLOOD GLUCOSE TEST) Strp 1 strip, Misc.(Non-Drug; Combo Route), 2 times daily before meals    blood-glucose meter kit 1 each, Other, Daily, Use as instructed    dapagliflozin propanediol (FARXIGA) 10 mg tablet Take one tablet by mouth daily    EScitalopram oxalate (LEXAPRO) 20 MG tablet TAKE 1 TABLET(20 MG) BY MOUTH EVERY DAY    lancets (ACTI-AROLDO LANCETS) 28 gauge Misc 1 lancet , Subcutaneous, 2 times daily before meals    lancets Misc Test bid    lisinopriL 10 mg, Oral, Daily    metFORMIN (GLUCOPHAGE) 500 MG tablet TAKE 2 TABLETS(1000 MG) BY MOUTH TWICE DAILY WITH MEALS       Lab Results   Component Value Date    HGBA1C 6.1 (H) 04/11/2024    HGBA1C 5.6 09/06/2023    HGBA1C 5.6 02/08/2023     Lab Results   Component Value Date    MICALBCREAT 9 02/08/2023     Lab Results   Component Value Date    LDLCALC 67 04/11/2024    LDLCALC 55 08/10/2022    CHOL 163 04/11/2024    HDL 77 04/11/2024    TRIG 111 04/11/2024       Lab Results   Component Value Date     04/11/2024    K 4.1 04/11/2024     04/11/2024    CO2 26 04/11/2024     (H) 04/11/2024    BUN 15 04/11/2024    CREATININE 0.52 04/11/2024    CALCIUM 9.3 04/11/2024    PROT 6.6 04/11/2024    ALBUMIN 4.5 04/11/2024    BILITOT 0.6 04/11/2024    ALKPHOS 50 (L) 11/10/2016    AST 15 04/11/2024    ALT 15 04/11/2024    ANIONGAP 9 11/10/2016    ESTGFRAFRICA 123 02/10/2022    EGFRNONAA 106 02/10/2022    WBC 5.0 04/11/2024    HGB 13.3 04/11/2024    HGB 13.5 02/08/2023    HCT 39.6 04/11/2024    MCV 89.0 04/11/2024     04/11/2024    TSH 1.66 08/10/2022    HEPCAB Negative 08/02/2016       No results found for: "LH", "FSH", "TOTALTESTOST", "PROGESTERONE", "ESTRADIOL", "DMABTGAN37WF", "YTECIFUR33", "FERRITIN", "IRON", "TRANSFERRIN", "TIBC", "FESATURATED", "ZINC"      Past Medical History:   Diagnosis Date    Abnormal CT scan, chest 03/14/2017    At [facility], subcarinal 3 cm appears to be left lobe thyroid MRI July 2017 [facility], Dr" "Ivy    Abnormal Pap smear of cervix     Anxiety 10/9/2013    Lexapro works well    Colon polyp     2016, repeat before 2021    Hot thyroid nodule 2009    Dr Haynes    Hyperlipidemia associated with type 2 diabetes mellitus 10/9/2013    Hypertension associated with diabetes 10/9/2013    Menopause     Seasonal allergies 2/16/2023    Trigger finger of left thumb 2/16/2023    Uncontrolled type 2 diabetes mellitus with hyperglycemia, without long-term current use of insulin 03/14/2017    [facility] q yr    Vaginal Pap smear 02/25/2013    Pap/Hpv negative  (Dr Garrison, [facility])      Past Surgical History:   Procedure Laterality Date    HYSTERECTOMY  2007    BARTOLOME/BSO for fibroids    NECK LESION BIOPSY  2017    Neck needle Bx -- Thyroid Nodule     THYROIDECTOMY, PARTIAL  2010    [facility]     Vitals:    04/30/24 1019   BP: 124/70   Pulse: 71   Resp: 18   SpO2: 97%   Weight: 82.2 kg (181 lb 3.5 oz)   Height: 5' 2" (1.575 m)   PainSc: 0-No pain     Wt Readings from Last 5 Encounters:   04/30/24 82.2 kg (181 lb 3.5 oz)   11/20/23 77.1 kg (170 lb)   02/16/23 79.3 kg (174 lb 13.2 oz)   08/24/22 75.8 kg (167 lb)   04/11/22 78.3 kg (172 lb 9.9 oz)     Objective:   Physical Exam  Constitutional:       Appearance: She is well-developed.   HENT:      Head: Normocephalic and atraumatic.      Right Ear: External ear normal.      Left Ear: External ear normal.      Nose: Nose normal.   Eyes:      Pupils: Pupils are equal, round, and reactive to light.   Neck:      Thyroid: No thyromegaly.   Cardiovascular:      Rate and Rhythm: Normal rate and regular rhythm.      Heart sounds: Normal heart sounds. No murmur heard.  Pulmonary:      Effort: Pulmonary effort is normal.      Breath sounds: Normal breath sounds. No wheezing.   Abdominal:      General: Bowel sounds are normal. There is no distension.      Palpations: Abdomen is soft. There is no mass.      Tenderness: There is no abdominal tenderness. There is no guarding or " rebound.   Musculoskeletal:      Cervical back: Neck supple.      Right foot: Normal range of motion. No deformity.      Left foot: Normal range of motion. No deformity.      Comments:      Feet:      Right foot:      Protective Sensation: 5 sites tested.  5 sites sensed.      Skin integrity: No ulcer, blister, skin breakdown, erythema or warmth.      Left foot:      Protective Sensation: 5 sites tested.  5 sites sensed.      Skin integrity: No ulcer, blister, skin breakdown, erythema or warmth.   Lymphadenopathy:      Cervical: No cervical adenopathy.   Skin:     General: Skin is warm and dry.   Neurological:      Mental Status: She is alert and oriented to person, place, and time.   Psychiatric:         Behavior: Behavior normal.

## 2024-05-17 ENCOUNTER — PATIENT MESSAGE (OUTPATIENT)
Dept: PRIMARY CARE CLINIC | Facility: CLINIC | Age: 59
End: 2024-05-17
Payer: COMMERCIAL

## 2024-05-17 DIAGNOSIS — E11.9 CONTROLLED TYPE 2 DIABETES MELLITUS WITHOUT COMPLICATION, WITHOUT LONG-TERM CURRENT USE OF INSULIN: ICD-10-CM

## 2024-05-17 RX ORDER — DAPAGLIFLOZIN 10 MG/1
10 TABLET, FILM COATED ORAL DAILY
Qty: 90 TABLET | Refills: 2 | Status: SHIPPED | OUTPATIENT
Start: 2024-05-17

## 2024-05-17 RX ORDER — DAPAGLIFLOZIN 10 MG/1
TABLET, FILM COATED ORAL
Qty: 90 TABLET | Refills: 0 | Status: CANCELLED | OUTPATIENT
Start: 2024-05-16 | End: 2024-08-14

## 2024-05-17 NOTE — TELEPHONE ENCOUNTER
LOV 04/30/24  Last refill 02/12/24  Patient wants this medication filled but I can't find it on med list.    dapagliflozin propanediol (FARXIGA) 10 mg tablet 90 tabs

## 2024-05-17 NOTE — TELEPHONE ENCOUNTER
No care due was identified.  [facility] Embedded Care Due Messages. Reference number: [id number].   5/17/2024 9:46:20 AM CDT

## 2024-05-29 ENCOUNTER — PATIENT OUTREACH (OUTPATIENT)
Dept: ADMINISTRATIVE | Facility: HOSPITAL | Age: 59
End: 2024-05-29
Payer: COMMERCIAL

## 2024-05-29 NOTE — PROGRESS NOTES
Patient due for the following    HIV Screening     Shingles Vaccine (1 of 2)    Eye Exam     COVID-19 Vaccine (3 - 2023-24 season)    RSV Vaccine (Age 60+ and Pregnant patients) (1 - 1-dose 60+ series)    Foot Exam       E-faxed Dr. Wellington for most recent eye exam records    Immunizations: reviewed and updated  Care Everywhere: triggered  Care Teams: up to date  Outreach: completed

## 2024-05-29 NOTE — LETTER
AUTHORIZATION FOR RELEASE OF   CONFIDENTIAL INFORMATION    Dear Dr. Wellington,    We are seeing Yamileth Wong, date of birth 1965, in the clinic at [facility]. Nicole Dickerson MD is the patient's PCP. Yamileth Wong has an outstanding lab/procedure at the time we reviewed her chart. In order to help keep her health information updated, she has authorized us to request the following medical record(s):        (  )  MAMMOGRAM                                      (  )  COLONOSCOPY      (  )  PAP SMEAR                                          (  )  OUTSIDE LAB RESULTS     (  )  DEXA SCAN                                          ( X )  EYE EXAM            (  )  FOOT EXAM                                          (  )  ENTIRE RECORD     (  )  OUTSIDE IMMUNIZATIONS                 (  )  _______________         Please fax records to [facility], Berner, Tara G., MD, [phone]     If you have any questions, please contact Omayra at [phone].           Patient Name: Yamileth Wong  : 1965  Patient Phone #: [phone]

## 2024-05-29 NOTE — LETTER
AUTHORIZATION FOR RELEASE OF   CONFIDENTIAL INFORMATION    Dear Dr. Wellington,    We are seeing Yamileth Wong, date of birth 1965, in the clinic at [facility]. Nicole Dickerson MD is the patient's PCP. Yamileth Wong has an outstanding lab/procedure at the time we reviewed her chart. In order to help keep her health information updated, she has authorized us to request the following medical record(s):        (  )  MAMMOGRAM                                      (  )  COLONOSCOPY      (  )  PAP SMEAR                                          (  )  OUTSIDE LAB RESULTS     (  )  DEXA SCAN                                          ( X )  EYE EXAM            (  )  FOOT EXAM                                          (  )  ENTIRE RECORD     (  )  OUTSIDE IMMUNIZATIONS                 (  )  _______________         Please fax records to [facility], Berner, Tara G., MD, [phone]     If you have any questions, please contact Omayra at [phone].           Patient Name: Yamileth Wong  : 1965  Patient Phone #: [phone]

## 2024-07-01 ENCOUNTER — PATIENT MESSAGE (OUTPATIENT)
Dept: PRIMARY CARE CLINIC | Facility: CLINIC | Age: 59
End: 2024-07-01
Payer: COMMERCIAL

## 2024-07-01 DIAGNOSIS — E11.65 UNCONTROLLED TYPE 2 DIABETES MELLITUS WITH HYPERGLYCEMIA, WITHOUT LONG-TERM CURRENT USE OF INSULIN: ICD-10-CM

## 2024-07-01 NOTE — TELEPHONE ENCOUNTER
No care due was identified.  [facility] Embedded Care Due Messages. Reference number: [id number].   7/01/2024 1:39:47 PM CDT

## 2024-07-02 RX ORDER — ATORVASTATIN CALCIUM 10 MG/1
10 TABLET, FILM COATED ORAL NIGHTLY
Qty: 90 TABLET | Refills: 2 | Status: SHIPPED | OUTPATIENT
Start: 2024-07-02

## 2024-07-02 RX ORDER — METFORMIN HYDROCHLORIDE 500 MG/1
1000 TABLET ORAL 2 TIMES DAILY WITH MEALS
Qty: 360 TABLET | Refills: 0 | Status: SHIPPED | OUTPATIENT
Start: 2024-07-02

## 2024-07-02 NOTE — TELEPHONE ENCOUNTER
Pt state she just saw you in April she have a appt to have blood work at [facility] in Oct. She feel like she do not need to see you so soon. She declined scheduling a appt.

## 2024-07-02 NOTE — TELEPHONE ENCOUNTER
Refill Routing Note   Medication(s) are not appropriate for processing by [facility] for the following reason(s):        No active prescription written by provider    [facility] action(s):  Defer               Appointments  past 12m or future 3m with PCP    Date Provider   Last Visit   4/30/2024 Nicole Dickerson MD   Next Visit   Visit date not found Nicole Dickerson MD   ED visits in past 90 days: 0        Note composed:10:49 AM 07/02/2024

## 2024-07-03 RX ORDER — ESCITALOPRAM OXALATE 20 MG/1
TABLET ORAL
Qty: 90 TABLET | Refills: 3 | Status: SHIPPED | OUTPATIENT
Start: 2024-07-03

## 2024-07-03 NOTE — TELEPHONE ENCOUNTER
No care due was identified.  [facility] Embedded Care Due Messages. Reference number: [id number].   7/03/2024 5:24:42 AM CDT

## 2024-07-03 NOTE — TELEPHONE ENCOUNTER
Yamileth Wong  is requesting a refill authorization.  Brief Assessment and Rationale for Refill:  Approve     Medication Therapy Plan:         Comments:     Note composed:6:03 AM 07/03/2024

## 2024-07-10 DIAGNOSIS — I15.2 HYPERTENSION ASSOCIATED WITH DIABETES: ICD-10-CM

## 2024-07-10 DIAGNOSIS — E11.59 HYPERTENSION ASSOCIATED WITH DIABETES: ICD-10-CM

## 2024-07-10 RX ORDER — LISINOPRIL 10 MG/1
10 TABLET ORAL DAILY
Qty: 90 TABLET | Refills: 1 | Status: SHIPPED | OUTPATIENT
Start: 2024-07-10

## 2024-07-10 NOTE — TELEPHONE ENCOUNTER
No care due was identified.  [facility] Embedded Care Due Messages. Reference number: [id number].   7/10/2024 10:13:39 AM CDT

## 2024-07-29 ENCOUNTER — NURSE TRIAGE (OUTPATIENT)
Dept: ADMINISTRATIVE | Facility: CLINIC | Age: 59
End: 2024-07-29
Payer: COMMERCIAL

## 2024-07-29 NOTE — TELEPHONE ENCOUNTER
Pt advised that Dr Dickerson is not in clinic today and there are no available appointments at the clinic today. Pt states she do not want to go to the ER she will go to Urgent Care for treatment.

## 2024-07-29 NOTE — TELEPHONE ENCOUNTER
[facility] RN  {Patient calling about a dog bite.  Punture wound to thigh, left thigh.  Neighbors dog, gave history of dog being up on his shots.  Care advise is to go to the ED.    Reason for Disposition   Any break in skin from BITE (e.g., cut, puncture, or scratch) and PET animal (e.g., dog, cat, or ferret) at risk for RABIES (e.g., sick, stray, unprovoked bite, developing country)    Additional Information   Negative: Major bleeding (actively dripping or spurting) that can't be stopped   Negative: Sounds like a life-threatening emergency to the triager   Negative: Any break in skin from BITE (e.g., cut, puncture, or scratch) and WILD animal at risk for RABIES (e.g., bat, raccoon, larry, skunk, coyote, other carnivores; see Background for list)    Protocols used: Animal Bite-A-[place]

## 2024-08-05 PROBLEM — Z00.00 ROUTINE GENERAL MEDICAL EXAMINATION AT A HEALTH CARE FACILITY: Status: RESOLVED | Noted: 2024-04-30 | Resolved: 2024-08-05

## 2024-08-08 RX ORDER — ESCITALOPRAM OXALATE 20 MG/1
TABLET ORAL
Qty: 90 TABLET | Refills: 2 | Status: SHIPPED | OUTPATIENT
Start: 2024-08-08

## 2024-08-09 RX ORDER — ESCITALOPRAM OXALATE 20 MG/1
20 TABLET ORAL DAILY
Qty: 90 TABLET | Refills: 2 | Status: CANCELLED | OUTPATIENT
Start: 2024-08-09

## 2024-10-01 DIAGNOSIS — E11.65 UNCONTROLLED TYPE 2 DIABETES MELLITUS WITH HYPERGLYCEMIA, WITHOUT LONG-TERM CURRENT USE OF INSULIN: ICD-10-CM

## 2024-10-01 RX ORDER — METFORMIN HYDROCHLORIDE 500 MG/1
1000 TABLET ORAL 2 TIMES DAILY WITH MEALS
Qty: 240 TABLET | Refills: 0 | Status: SHIPPED | OUTPATIENT
Start: 2024-10-01 | End: 2024-11-30

## 2024-10-01 NOTE — TELEPHONE ENCOUNTER
No care due was identified.  [facility] Embedded Care Due Messages. Reference number: [id number].   10/01/2024 5:23:49 AM CDT

## 2024-10-01 NOTE — TELEPHONE ENCOUNTER
Refill Routing Note   Medication(s) are not appropriate for processing by [facility] for the following reason(s):        New or recently adjusted medication    [facility] action(s):  Defer             Appointments  past 12m or future 3m with PCP    Date Provider   Last Visit   4/30/2024 Nicole Dickerson MD   Next Visit   Visit date not found Nicole Dickerson MD   ED visits in past 90 days: 0        Note composed:9:36 AM 10/01/2024

## 2024-11-14 ENCOUNTER — TELEPHONE (OUTPATIENT)
Dept: PRIMARY CARE CLINIC | Facility: CLINIC | Age: 59
End: 2024-11-14
Payer: COMMERCIAL

## 2024-11-14 NOTE — TELEPHONE ENCOUNTER
----- Message from Nicole Dickerson MD sent at 11/8/2024  1:44 PM CST -----  Please offer 6 month Virtual appt w me to discuss DM

## 2024-12-09 DIAGNOSIS — E11.65 UNCONTROLLED TYPE 2 DIABETES MELLITUS WITH HYPERGLYCEMIA, WITHOUT LONG-TERM CURRENT USE OF INSULIN: ICD-10-CM

## 2024-12-09 NOTE — TELEPHONE ENCOUNTER
Please deny, I called and derik tilley pt, she states this is an erroneous entry and she rojelio request refill when needed.

## 2024-12-10 ENCOUNTER — OFFICE VISIT (OUTPATIENT)
Dept: PRIMARY CARE CLINIC | Facility: CLINIC | Age: 59
End: 2024-12-10
Payer: COMMERCIAL

## 2024-12-10 ENCOUNTER — TELEPHONE (OUTPATIENT)
Dept: PRIMARY CARE CLINIC | Facility: CLINIC | Age: 59
End: 2024-12-10

## 2024-12-10 DIAGNOSIS — Z00.00 ROUTINE GENERAL MEDICAL EXAMINATION AT A HEALTH CARE FACILITY: Primary | ICD-10-CM

## 2024-12-10 DIAGNOSIS — R63.5 WEIGHT GAIN: ICD-10-CM

## 2024-12-10 DIAGNOSIS — I10 BENIGN HYPERTENSION: ICD-10-CM

## 2024-12-10 DIAGNOSIS — E11.9 CONTROLLED TYPE 2 DIABETES MELLITUS WITHOUT COMPLICATION, WITHOUT LONG-TERM CURRENT USE OF INSULIN: ICD-10-CM

## 2024-12-10 DIAGNOSIS — E11.65 UNCONTROLLED TYPE 2 DIABETES MELLITUS WITH HYPERGLYCEMIA, WITHOUT LONG-TERM CURRENT USE OF INSULIN: ICD-10-CM

## 2024-12-10 PROCEDURE — 4010F ACE/ARB THERAPY RXD/TAKEN: CPT | Mod: CPTII,95,, | Performed by: FAMILY MEDICINE

## 2024-12-10 PROCEDURE — 3066F NEPHROPATHY DOC TX: CPT | Mod: CPTII,95,, | Performed by: FAMILY MEDICINE

## 2024-12-10 PROCEDURE — 1160F RVW MEDS BY RX/DR IN RCRD: CPT | Mod: CPTII,95,, | Performed by: FAMILY MEDICINE

## 2024-12-10 PROCEDURE — 1159F MED LIST DOCD IN RCRD: CPT | Mod: CPTII,95,, | Performed by: FAMILY MEDICINE

## 2024-12-10 PROCEDURE — 99214 OFFICE O/P EST MOD 30 MIN: CPT | Mod: 95,,, | Performed by: FAMILY MEDICINE

## 2024-12-10 PROCEDURE — G2211 COMPLEX E/M VISIT ADD ON: HCPCS | Mod: 95,,, | Performed by: FAMILY MEDICINE

## 2024-12-10 PROCEDURE — 3044F HG A1C LEVEL LT 7.0%: CPT | Mod: CPTII,95,, | Performed by: FAMILY MEDICINE

## 2024-12-10 PROCEDURE — 3060F POS MICROALBUMINURIA REV: CPT | Mod: CPTII,95,, | Performed by: FAMILY MEDICINE

## 2024-12-10 RX ORDER — METFORMIN HYDROCHLORIDE 500 MG/1
1000 TABLET ORAL 2 TIMES DAILY WITH MEALS
Qty: 360 TABLET | Refills: 1 | Status: SHIPPED | OUTPATIENT
Start: 2024-12-10

## 2024-12-10 RX ORDER — METFORMIN HYDROCHLORIDE 500 MG/1
1000 TABLET ORAL 2 TIMES DAILY WITH MEALS
Qty: 240 TABLET | Refills: 0 | OUTPATIENT
Start: 2024-12-10

## 2024-12-10 NOTE — PROGRESS NOTES
Assessment:          Controlled type 2 diabetes mellitus without complication, without long-term current use of insulin    2.  Benign hypertension    3.  Weight gain         Plan:     Controlled type 2 diabetes mellitus without complication, without long-term current use of insulin  Stable 6.4%, was 6.1%,   continue MEtformin 500, 2 tabs in am & pm  & Farxiga 10 daily    With stress of brother's new prostate cancer & after her dog bite, she is working at home & hard to motivate herself to walk.     See me in 6 mo for PHysical w Hga1c, urine micro CMP & lipid prior at [facility]    Try to walk for a continuous 10 MINUTES EVERY DAY- in your house or outside (huffing & puffing burns calories & strengthens your heart).     Be aware of everything you eat. Read labels.   Try to keep < 5 g of sugar in ONE SERVING size    Try writing it down so you can see where sugars & carbohydrates are creeping into your foods (drinks other than water, salad dressing, snacks)    Remember, all white bread, white flour (used in baking)  white pasta, white rice, white potatoes, chips, crackers, cookies, sweets, sodas (even Gatorade, Powerade,Koolaid) , sugary coffee & tea,  desserts ----TURN INTO SUGAR.     Focus on moving more & cutting out  bread,  pasta, rice, chips (or reduce portion size in half)      Benign hypertension  Stable (goal < 129/79)  Continue Lisinopril 10 daily      Weight gain  15#, will focus on more movement.           HPI: Yamileth Wong is a 59 y.o. female with is here today for DM    Here today for diabetes mellitus   Lab Results   Component Value Date    HGBA1C 6.4 (H) 10/30/2024    HGBA1C 6.1 (H) 04/11/2024    HGBA1C 5.6 09/06/2023     (H) 04/11/2024    MICALBCREAT 33.3 (H) 04/30/2024    CREATININE 0.52 04/11/2024    ESTGFRAFRICA 123 02/10/2022    EGFRNONAA 106 02/10/2022    LDLCALC 67 04/11/2024       Wt Readings from Last 5 Encounters:   04/30/24 82.2 kg (181 lb 3.5 oz)   11/20/23 77.1 kg (170 lb)    02/16/23 79.3 kg (174 lb 13.2 oz)   08/24/22 75.8 kg (167 lb)   04/11/22 78.3 kg (172 lb 9.9 oz)       MEDS:  Diabetes Medications               dapagliflozin propanediol (FARXIGA) 10 mg tablet Take 1 tablet (10 mg total) by mouth once daily.    metFORMIN (GLUCOPHAGE) 500 MG tablet Take 2 tablets (1,000 mg total) by mouth 2 (two) times daily with meals.           The patient location is: home  The chief complaint leading to consultation is: DM    Visit type: audiovisual    Face to Face time with patient: 15  20 minutes of total time spent on the encounter, which includes face to face time and non-face to face time preparing to see the patient (eg, review of tests), Obtaining and/or reviewing separately obtained history, Documenting clinical information in the electronic or other health record, Independently interpreting results (not separately reported) and communicating results to the patient/family/caregiver, or Care coordination (not separately reported).         Each patient to whom he or she provides medical services by telemedicine is:  (1) informed of the relationship between the physician and patient and the respective role of any other health care provider with respect to management of the patient; and (2) notified that he or she may decline to receive medical services by telemedicine and may withdraw from such care at any time.    Notes:       History of Present Illness    CHIEF COMPLAINT:  Patient presents today for follow-up of diabetes management.    DIABETES MANAGEMENT:  She reports current medications for diabetes management include Metformin 500 mg, two tablets twice daily, and Farxiga 10 mg. Blood sugar levels have increased. She acknowledges weight gain of approximately 15 lbs over the past 6 months and attributes this to lack of exercise and recent family stressors. She expresses understanding of the need to increase physical activity and improve diabetes management.    RECENT LIFE  STRESSORS:  She reports experiencing significant life stressors over the past six months. Her brother was diagnosed with prostate cancer shortly after July 4th, causing emotional distress. She stayed with her brother for 2-3 weeks to provide support. Her brother's first PSA test showed the cancer was contained to the prostate, and his prognosis appears favorable. She suffered a dog bite incident in her own yard at the end of July, which has impacted her ability to exercise.    LIFESTYLE AND EXERCISE:  She reports a lack of exercise due to the dog bite incident and family health issues. She expresses fear of walking outdoors and considers using a stick or umbrella for protection. She acknowledges sedentary work habits, including eating lunch at the computer and prolonged sitting. She recognizes the need to be more active and expresses a desire to improve her physical activity levels.    MEDICATION MANAGEMENT:  She reports issues with medication refills, particularly around the 4th of July when she had to travel to assist her brother. She experienced difficulties due to pharmacy not having the current prescriber listed. She states that automatic refills were set up, but there were problems with cooperation from both the pharmacy and the provider's office. She expresses confusion about her current prescriber, noting that previous prescriptions were under a different provider's name (Amanda). She requests clarification on whether all her medications are now under Dr. Dickerson's name to avoid future refill issues. She estimates she has about two weeks of Metformin remaining and inquires about obtaining a 90-day supply for cost-effectiveness.      ROS:  ROS as indicated in HPI.         Denies chest pain, shortness of breath    Current Outpatient Medications   Medication Instructions    atorvastatin (LIPITOR) 10 mg, Oral, Nightly    blood sugar diagnostic (BLOOD GLUCOSE TEST) Strp 1 strip, Misc.(Non-Drug; Combo Route), 2  "times daily before meals    blood-glucose meter kit 1 each, Other, Daily, Use as instructed    EScitalopram oxalate (LEXAPRO) 20 MG tablet TAKE 1 TABLET(20 MG) BY MOUTH EVERY DAY    FARXIGA 10 mg, Oral, Daily    lancets (ACTI-AROLDO LANCETS) 28 gauge Misc 1 lancet , Subcutaneous, 2 times daily before meals    lancets Misc Test bid    lisinopriL 10 mg, Oral, Daily    metFORMIN (GLUCOPHAGE) 1,000 mg, Oral, 2 times daily with meals    pneumoc 20-dominique conj-dip cr,PF, (PREVNAR 20, PF,) 0.5 mL Syrg injection Intramuscular       Lab Results   Component Value Date    HGBA1C 6.4 (H) 10/30/2024    HGBA1C 6.1 (H) 04/11/2024    HGBA1C 5.6 09/06/2023     Lab Results   Component Value Date    MICALBCREAT 33.3 (H) 04/30/2024     Lab Results   Component Value Date    LDLCALC 67 04/11/2024    LDLCALC 55 08/10/2022    CHOL 163 04/11/2024    HDL 77 04/11/2024    TRIG 111 04/11/2024       Lab Results   Component Value Date     04/11/2024    K 4.1 04/11/2024     04/11/2024    CO2 26 04/11/2024     (H) 04/11/2024    BUN 15 04/11/2024    CREATININE 0.52 04/11/2024    CALCIUM 9.3 04/11/2024    PROT 6.6 04/11/2024    ALBUMIN 4.5 04/11/2024    BILITOT 0.6 04/11/2024    ALKPHOS 50 (L) 11/10/2016    AST 15 04/11/2024    ALT 15 04/11/2024    ANIONGAP 9 11/10/2016    ESTGFRAFRICA 123 02/10/2022    EGFRNONAA 106 02/10/2022    WBC 5.0 04/11/2024    HGB 13.3 04/11/2024    HGB 13.5 02/08/2023    HCT 39.6 04/11/2024    MCV 89.0 04/11/2024     04/11/2024    TSH 1.66 08/10/2022    HEPCAB Negative 08/02/2016       No results found for: "LH", "FSH", "TOTALTESTOST", "PROGESTERONE", "ESTRADIOL", "XTHUWHVF47XB", "FQCHAGOE68", "FERRITIN", "IRON", "TRANSFERRIN", "TIBC", "FESATURATED", "ZINC"      Past Medical History:   Diagnosis Date    Abnormal CT scan, chest 03/14/2017    At [facility], subcarinal 3 cm appears to be left lobe thyroid MRI July 2017 [facility], Dr Haynes    Abnormal Pap smear of cervix     Anxiety 10/9/2013    Lexapro " works well    Colon polyp     2016, repeat before 2021    Hot thyroid nodule 2009    Dr Haynes    Hyperlipidemia associated with type 2 diabetes mellitus 10/9/2013    Hypertension associated with diabetes 10/9/2013    Menopause     Seasonal allergies 2/16/2023    Trigger finger of left thumb 2/16/2023    Uncontrolled type 2 diabetes mellitus with hyperglycemia, without long-term current use of insulin 03/14/2017    [facility]    Vaginal Pap smear 02/25/2013    Pap/Hpv negative  (Dr Garrison, [facility])      Past Surgical History:   Procedure Laterality Date    HYSTERECTOMY  2007    BARTOLOME/BSO for fibroids    NECK LESION BIOPSY  2017    Neck needle Bx -- Thyroid Nodule     THYROIDECTOMY, PARTIAL  2010    [facility]     There were no vitals filed for this visit.  Wt Readings from Last 5 Encounters:   04/30/24 82.2 kg (181 lb 3.5 oz)   11/20/23 77.1 kg (170 lb)   02/16/23 79.3 kg (174 lb 13.2 oz)   08/24/22 75.8 kg (167 lb)   04/11/22 78.3 kg (172 lb 9.9 oz)     Objective:   Physical Exam

## 2024-12-10 NOTE — ASSESSMENT & PLAN NOTE
Stable 6.4%, was 6.1%,   continue MEtformin 500, 2 tabs in am & pm  & Farxiga 10 daily    With stress of brother's new prostate cancer & after her dog bite, she is working at home & hard to motivate herself to walk.     See me in 6 mo for PHysical w Hga1c, urine micro CMP & lipid prior at [facility]    Try to walk for a continuous 10 MINUTES EVERY DAY- in your house or outside (huffing & puffing burns calories & strengthens your heart).     Be aware of everything you eat. Read labels.   Try to keep < 5 g of sugar in ONE SERVING size    Try writing it down so you can see where sugars & carbohydrates are creeping into your foods (drinks other than water, salad dressing, snacks)    Remember, all white bread, white flour (used in baking)  white pasta, white rice, white potatoes, chips, crackers, cookies, sweets, sodas (even Gatorade, Powerade,Koolaid) , sugary coffee & tea,  desserts ----TURN INTO SUGAR.     Focus on moving more & cutting out  bread,  pasta, rice, chips (or reduce portion size in half)

## 2024-12-10 NOTE — TELEPHONE ENCOUNTER
----- Message from Roslyn sent at 12/10/2024  3:00 PM CST -----  Contact: [phone]  Patient would like her lab orders sent to [facility]. Please call and advise.    Thank you and have a great day.

## 2025-02-12 RX ORDER — DAPAGLIFLOZIN 10 MG/1
10 TABLET, FILM COATED ORAL DAILY
Qty: 90 TABLET | Refills: 0 | Status: SHIPPED | OUTPATIENT
Start: 2025-02-12

## 2025-02-12 NOTE — TELEPHONE ENCOUNTER
Care Due:                  Date            Visit Type   Department     Provider  --------------------------------------------------------------------------------                                ESTABLISHED                              PATIENT -    [facility] PRIMARY  Last Visit: 12-      [facility]      CARE           Nicole Dickerson                               -                              PRIMARY      [facility] PRIMARY  Next Visit: 06-      CARE ([facility])   [facility]           Nicole Dickerson                                                            Last  Test          Frequency    Reason                     Performed    Due Date  --------------------------------------------------------------------------------    CMP.........  12 months..  atorvastatin,              04- 04-                             dapagliflozin,                             lisinopriL, metFORMIN....    HBA1C.......  6 months...  dapagliflozin, metFORMIN.  10-   04-    Lipid Panel.  12 months..  atorvastatin.............  04- 04-    Health [facility] Embedded Care Due Messages. Reference number: [id number].   2/12/2025 9:25:58 AM CST

## 2025-02-12 NOTE — TELEPHONE ENCOUNTER
Provider Staff:  Action required for this patient     Please see care gap opportunities below in Care Due Message.    Thanks!  [facility]     Appointments      Date Provider   Last Visit   12/10/2024 Nicole Dickerson MD   Next Visit   6/10/2025 Nicole Dickerson MD      Refill Decision Note   Yamileth Wong  is requesting a refill authorization.  Brief Assessment and Rationale for Refill:  Approve     Medication Therapy Plan:         Comments:     Note composed:12:00 PM 02/12/2025

## 2025-03-21 ENCOUNTER — RESULTS FOLLOW-UP (OUTPATIENT)
Dept: OBSTETRICS AND GYNECOLOGY | Facility: CLINIC | Age: 60
End: 2025-03-21

## 2025-03-28 NOTE — TELEPHONE ENCOUNTER
No care due was identified.  Health [facility] Embedded Care Due Messages. Reference number: [id number].   3/28/2025 10:48:44 AM CDT

## 2025-04-01 RX ORDER — ATORVASTATIN CALCIUM 10 MG/1
10 TABLET, FILM COATED ORAL NIGHTLY
Qty: 90 TABLET | Refills: 1 | Status: SHIPPED | OUTPATIENT
Start: 2025-04-01

## 2025-04-03 DIAGNOSIS — E11.59 HYPERTENSION ASSOCIATED WITH DIABETES: ICD-10-CM

## 2025-04-03 DIAGNOSIS — I15.2 HYPERTENSION ASSOCIATED WITH DIABETES: ICD-10-CM

## 2025-04-03 DIAGNOSIS — E11.9 CONTROLLED TYPE 2 DIABETES MELLITUS WITHOUT COMPLICATION, WITHOUT LONG-TERM CURRENT USE OF INSULIN: ICD-10-CM

## 2025-04-03 NOTE — TELEPHONE ENCOUNTER
No care due was identified.  [facility] Embedded Care Due Messages. Reference number: [id number].   4/03/2025 3:19:29 PM CDT

## 2025-04-04 RX ORDER — LISINOPRIL 10 MG/1
10 TABLET ORAL DAILY
Qty: 90 TABLET | Refills: 0 | Status: SHIPPED | OUTPATIENT
Start: 2025-04-04

## 2025-04-04 NOTE — TELEPHONE ENCOUNTER
Med refilled. Please schedule labs (Hga1c, urine micro CMP & lipid prior )at [facility]  prior to    Her June appt w me. Thanks.

## 2025-04-29 ENCOUNTER — OFFICE VISIT (OUTPATIENT)
Dept: OBSTETRICS AND GYNECOLOGY | Facility: CLINIC | Age: 60
End: 2025-04-29
Payer: COMMERCIAL

## 2025-04-29 VITALS — HEIGHT: 62 IN | BODY MASS INDEX: 34.89 KG/M2 | WEIGHT: 189.63 LBS

## 2025-04-29 DIAGNOSIS — Z01.419 ENCOUNTER FOR GYNECOLOGICAL EXAMINATION: Primary | ICD-10-CM

## 2025-04-29 PROCEDURE — 99396 PREV VISIT EST AGE 40-64: CPT | Mod: S$GLB,,, | Performed by: OBSTETRICS & GYNECOLOGY

## 2025-04-29 PROCEDURE — 3008F BODY MASS INDEX DOCD: CPT | Mod: CPTII,S$GLB,, | Performed by: OBSTETRICS & GYNECOLOGY

## 2025-04-29 PROCEDURE — 1159F MED LIST DOCD IN RCRD: CPT | Mod: CPTII,S$GLB,, | Performed by: OBSTETRICS & GYNECOLOGY

## 2025-04-29 PROCEDURE — 4010F ACE/ARB THERAPY RXD/TAKEN: CPT | Mod: CPTII,S$GLB,, | Performed by: OBSTETRICS & GYNECOLOGY

## 2025-04-29 PROCEDURE — 99999 PR PBB SHADOW E&M-EST. PATIENT-LVL III: CPT | Mod: PBBFAC,,, | Performed by: OBSTETRICS & GYNECOLOGY

## 2025-04-29 NOTE — PROGRESS NOTES
Chief Complaint Well woman exam:  Annual Exam    She is established.     Yamileth Wong is a 59 y.o. female  presents for a well woman exam.    S/p BARTOLOME and BSO  Has Controlled type 2 diabetes mellitus - sees Dr Dickerson  No GYN c/o     ROS:  No abdominal pain No vaginal bleeding or discharge  No breast pain or masses, No rectal bleeding     LMP: No LMP recorded (lmp unknown). Patient has had a hysterectomy..    Meds per MD: none     Last Pap: BARTOLOME/ BSO  Last MMG: 3- birads 2 [facility], T-C 8%  Last Colonoscopy:  normal reepat 5 yrs due to hx polyps  Last Bone Density: yrs ago    Past Medical History:   Diagnosis Date    Abnormal CT scan, chest 2017    At [facility], subcarinal 3 cm appears to be left lobe thyroid MRI 2017 [facility], Dr Haynes    Abnormal Pap smear of cervix     Anxiety 10/9/2013    Lexapro works well    Colon polyp     , repeat before     Hot thyroid nodule     Dr Haynes    Hyperlipidemia associated with type 2 diabetes mellitus 10/9/2013    Hypertension associated with diabetes 10/9/2013    Menopause     Seasonal allergies 2023    Trigger finger of left thumb 2023    Uncontrolled type 2 diabetes mellitus with hyperglycemia, without long-term current use of insulin 2017    [facility]    Vaginal Pap smear 2013    [place]  [place] [facility]        Past Surgical History:   Procedure Laterality Date    HYSTERECTOMY      BARTOLOME/BSO for fibroids    NECK LESION BIOPSY      Neck needle Bx -- Thyroid Nodule     THYROIDECTOMY, PARTIAL      [facility]       OB History    Para Term  AB Living   0 0 0 0 0 0   SAB IAB Ectopic Multiple Live Births   0 0 0 0        Family History   Problem Relation Name Age of Onset    Diabetes Maternal Grandmother      Heart attack Father      Cervical cancer Mother      Hyperlipidemia Brother      Obesity Brother      Diabetes Brother      Colon cancer Neg Hx      Breast  "cancer Neg Hx         Social History[1]        Physical Exam:  Ht 5' 2" (1.575 m)   Wt 86 kg (189 lb 9.5 oz)   LMP  (LMP Unknown) Comment: BARTOLOME/BSO  2007  BMI 34.68 kg/m²     APPEARANCE: Well nourished, well developed, in no acute distress.  AFFECT: WNL, alert and oriented x 3  SKIN: No acne or hirsutism  BREASTS: Symmetrical, no skin changes.                      No nipple discharge.   No palpable masses bilaterally  NODES: No inguinal nor axillary LAD  ABDOMEN: soft Non tender Non distended No masses  PELVIC: Female chaperone was present in the room during pelvic exam.  Normal external genitalia without lesions.   Normal urethral meatus.   No signif cystocele or rectocele.  Vagina atrophic without lesions or discharge.  Cuff intact  Cervix absent  Adnexa without masses or tenderness.    EXTREMITIES: No edema.    ASSESSMENT AND PLAN  Yamileth was seen today for annual exam.    Diagnoses and all orders for this visit:    Encounter for gynecological examination      - PAP N/A [facility]  - normal exam   - MMG UTD next due 3/26 at [facility]   - Colon screening UTD         - BP normotensive    Patient was counseled today on A.C.S. Pap guidelines and recommendations for yearly pelvic exams, mammograms and monthly self breast exams; to see her PCP for other health maintenance.     Patient encouraged to register for portal and results will be sent via portal.             Health Maintenance   Topic Date Due    HIV Screening  Never done    Shingles Vaccine (1 of 2) Never done    RSV Vaccine (Age 60+ and Pregnant patients) (1 - Risk 60-74 years 1-dose series) Never done    Influenza Vaccine (1) Never done    COVID-19 Vaccine (3 - 2024-25 season) 09/01/2024    Lipid Panel  04/11/2025    Foot Exam  04/24/2025    Diabetes Urine Screening  04/30/2025    Hemoglobin A1c  04/30/2025    Diabetic Eye Exam  05/28/2025    Mammogram  03/19/2026    Low Dose Statin  04/01/2026    Colorectal Cancer Screening  07/30/2031    TETANUS VACCINE  " 07/29/2034    Hepatitis C Screening  Completed    Pneumococcal Vaccines (Age 50+)  Completed                          [1]   Social History  Tobacco Use    Smoking status: Never    Smokeless tobacco: Never   Substance Use Topics    Alcohol use: Yes     Comment: Social     Drug use: No

## 2025-04-29 NOTE — PROGRESS NOTES
LMP: No LMP recorded (lmp unknown). Patient has had a hysterectomy..    Meds per MD: none    Last Pap: BARTOLOME/ BSO  Last MMG: 3- birads 2 [facility], T-C 8%  Last Colonoscopy: 2021 normal  Last Bone Density: yrs ago

## 2025-05-12 NOTE — TELEPHONE ENCOUNTER
Care Due:                  Date            Visit Type   Department     Provider  --------------------------------------------------------------------------------                                ESTABLISHED                              PATIENT -    [facility] PRIMARY  Last Visit: 12-      [facility]           Nicole Dickerson                               -                              PRIMARY      [facility] PRIMARY  Next Visit: 06-      CARE ([facility])   [facility]           Nicole Dickerson                                                            Last  Test          Frequency    Reason                     Performed    Due Date  --------------------------------------------------------------------------------    CMP.........  12 months..  atorvastatin,              04- 04-                             dapagliflozin,                             lisinopriL, metFORMIN....    HBA1C.......  6 months...  dapagliflozin, metFORMIN.  10-   04-    Lipid Panel.  12 months..  atorvastatin.............  04- 04-    Health [facility] Embedded Care Due Messages. Reference number: [id number].   5/12/2025 10:01:48 AM CDT

## 2025-05-13 RX ORDER — DAPAGLIFLOZIN 10 MG/1
10 TABLET, FILM COATED ORAL DAILY
Qty: 90 TABLET | Refills: 0 | Status: SHIPPED | OUTPATIENT
Start: 2025-05-13

## 2025-05-13 NOTE — TELEPHONE ENCOUNTER
Refill Routing Note   Medication(s) are not appropriate for processing by [facility] for the following reason(s):        Required labs outdated  Required vitals outdated    [facility] action(s):  Defer     Requires labs : Yes             Appointments  past 12m or future 3m with PCP    Date Provider   Last Visit   12/10/2024 Nicole Dickerson MD   Next Visit   6/10/2025 Nicole Dickerson MD   ED visits in past 90 days: 0        Note composed:8:10 PM 05/12/2025

## 2025-06-04 DIAGNOSIS — E11.9 TYPE 2 DIABETES MELLITUS WITHOUT COMPLICATION, UNSPECIFIED WHETHER LONG TERM INSULIN USE: ICD-10-CM

## 2025-06-10 ENCOUNTER — OFFICE VISIT (OUTPATIENT)
Dept: PRIMARY CARE CLINIC | Facility: CLINIC | Age: 60
End: 2025-06-10
Payer: COMMERCIAL

## 2025-06-10 ENCOUNTER — PATIENT OUTREACH (OUTPATIENT)
Dept: ADMINISTRATIVE | Facility: HOSPITAL | Age: 60
End: 2025-06-10
Payer: COMMERCIAL

## 2025-06-10 VITALS
TEMPERATURE: 99 F | WEIGHT: 187.81 LBS | HEIGHT: 62 IN | HEART RATE: 86 BPM | DIASTOLIC BLOOD PRESSURE: 60 MMHG | BODY MASS INDEX: 34.56 KG/M2 | OXYGEN SATURATION: 98 % | SYSTOLIC BLOOD PRESSURE: 120 MMHG

## 2025-06-10 DIAGNOSIS — E11.9 CONTROLLED TYPE 2 DIABETES MELLITUS WITHOUT COMPLICATION, WITHOUT LONG-TERM CURRENT USE OF INSULIN: Primary | ICD-10-CM

## 2025-06-10 DIAGNOSIS — E11.65 UNCONTROLLED TYPE 2 DIABETES MELLITUS WITH HYPERGLYCEMIA, WITHOUT LONG-TERM CURRENT USE OF INSULIN: ICD-10-CM

## 2025-06-10 DIAGNOSIS — E11.9 ENCOUNTER FOR COMPREHENSIVE DIABETIC FOOT EXAMINATION, TYPE 2 DIABETES MELLITUS: ICD-10-CM

## 2025-06-10 LAB
ALBUMIN/CREAT UR: 88.2 UG/MG
CREAT UR-MCNC: 17 MG/DL (ref 15–325)
MICROALBUMIN UR-MCNC: 15 UG/ML (ref ?–5000)

## 2025-06-10 PROCEDURE — 3066F NEPHROPATHY DOC TX: CPT | Mod: CPTII,S$GLB,, | Performed by: FAMILY MEDICINE

## 2025-06-10 PROCEDURE — 3008F BODY MASS INDEX DOCD: CPT | Mod: CPTII,S$GLB,, | Performed by: FAMILY MEDICINE

## 2025-06-10 PROCEDURE — 3060F POS MICROALBUMINURIA REV: CPT | Mod: CPTII,S$GLB,, | Performed by: FAMILY MEDICINE

## 2025-06-10 PROCEDURE — 99999 PR PBB SHADOW E&M-EST. PATIENT-LVL III: CPT | Mod: PBBFAC,,, | Performed by: FAMILY MEDICINE

## 2025-06-10 PROCEDURE — 1159F MED LIST DOCD IN RCRD: CPT | Mod: CPTII,S$GLB,, | Performed by: FAMILY MEDICINE

## 2025-06-10 PROCEDURE — 1160F RVW MEDS BY RX/DR IN RCRD: CPT | Mod: CPTII,S$GLB,, | Performed by: FAMILY MEDICINE

## 2025-06-10 PROCEDURE — 3074F SYST BP LT 130 MM HG: CPT | Mod: CPTII,S$GLB,, | Performed by: FAMILY MEDICINE

## 2025-06-10 PROCEDURE — 99214 OFFICE O/P EST MOD 30 MIN: CPT | Mod: S$GLB,,, | Performed by: FAMILY MEDICINE

## 2025-06-10 PROCEDURE — 82043 UR ALBUMIN QUANTITATIVE: CPT | Performed by: FAMILY MEDICINE

## 2025-06-10 PROCEDURE — 3078F DIAST BP <80 MM HG: CPT | Mod: CPTII,S$GLB,, | Performed by: FAMILY MEDICINE

## 2025-06-10 PROCEDURE — 4010F ACE/ARB THERAPY RXD/TAKEN: CPT | Mod: CPTII,S$GLB,, | Performed by: FAMILY MEDICINE

## 2025-06-10 PROCEDURE — G2211 COMPLEX E/M VISIT ADD ON: HCPCS | Mod: S$GLB,,, | Performed by: FAMILY MEDICINE

## 2025-06-10 PROCEDURE — 3044F HG A1C LEVEL LT 7.0%: CPT | Mod: CPTII,S$GLB,, | Performed by: FAMILY MEDICINE

## 2025-06-10 RX ORDER — METFORMIN HYDROCHLORIDE 500 MG/1
1000 TABLET ORAL 2 TIMES DAILY WITH MEALS
Qty: 360 TABLET | Refills: 2 | Status: SHIPPED | OUTPATIENT
Start: 2025-06-10

## 2025-06-10 RX ORDER — DAPAGLIFLOZIN 10 MG/1
10 TABLET, FILM COATED ORAL DAILY
Qty: 90 TABLET | Refills: 2 | Status: SHIPPED | OUTPATIENT
Start: 2025-06-10

## 2025-06-10 RX ORDER — LISINOPRIL 10 MG/1
10 TABLET ORAL DAILY
Qty: 90 TABLET | Refills: 2 | Status: SHIPPED | OUTPATIENT
Start: 2025-06-10 | End: 2025-06-11 | Stop reason: SDUPTHER

## 2025-06-10 RX ORDER — ATORVASTATIN CALCIUM 10 MG/1
10 TABLET, FILM COATED ORAL NIGHTLY
Qty: 90 TABLET | Refills: 2 | Status: SHIPPED | OUTPATIENT
Start: 2025-06-10

## 2025-06-10 RX ORDER — ESCITALOPRAM OXALATE 20 MG/1
20 TABLET ORAL DAILY
Qty: 90 TABLET | Refills: 2 | Status: SHIPPED | OUTPATIENT
Start: 2025-06-10

## 2025-06-10 NOTE — LETTER
AUTHORIZATION FOR RELEASE OF   CONFIDENTIAL INFORMATION    Dear Dr Wellington,    We are seeing Yamileth Wong, date of birth 1965, in the clinic at [facility]. Nicole Dickerson MD is the patient's PCP. Yamileth Wong has an outstanding lab/procedure at the time we reviewed her chart. In order to help keep her health information updated, she has authorized us to request the following medical record(s):        (  )  MAMMOGRAM                                      (  )  COLONOSCOPY      (  )  PAP SMEAR                                          (  )  OUTSIDE LAB RESULTS     (  )  DEXA SCAN                                          ( X )  EYE EXAM            (  )  FOOT EXAM                                          (  )  ENTIRE RECORD     (  )  OUTSIDE IMMUNIZATIONS                 (  )  _______________         Please fax records to Nicole Dickerson MD, [phone] attn Rafi          Patient Name: Yamileth Wong  : 1965  Patient Phone #: [phone]                                      Yamileth Wong  MRN: [id number]  : 1965  Age: 59 y.o.  Sex: female         Patient/Legal Guardian Signature  This signature was collected at 2025           _______________________________   Printed Name/Relationship to Patient      Consent for Examination and Treatment: I hereby authorize the providers and employees of [facility] ([facility]) to provide medical treatment/services which includes, but is not limited to, performing and administering tests and diagnostic procedures that are deemed necessary, including, but not limited to, imaging examinations, blood tests and other laboratory procedures as may be required by the hospital, clinic, or may be ordered by my physician(s) or persons working under the general and/or special instructions of my physician(s).      I understand and agree that this consent covers all authorized persons, including but not limited to physicians,  residents, nurse practitioners, physicians' assistants, specialists, consultants, student nurses, and independently contracted physicians, who are called upon by the physician in charge, to carry out the diagnostic procedures and medical or surgical treatment.     I hereby authorize [facility] to retain or dispose of any specimens or tissue, should there be such remaining from any test or procedure.     I hereby authorize and give consent for [facility] providers and employees to take photographs, images or videotapes of such diagnostic, surgical or treatment procedures of Patient as may be required by [facility] or as may be ordered by a physician. I further acknowledge and agree that [facility] may use cameras or other devices for patient monitoring.     I am aware that the practice of medicine is not an exact science, and I acknowledge that no guarantees have been made to me as to the outcome of any tests, procedures or treatment.     Authorization for Release of Information: I understand that my insurance company and/or their agents may need information necessary to make determinations about payment/reimbursement. I hereby provide authorization to release to all insurance companies, their successors, assignees, other parties with whom they may have contracted, or others acting on their behalf, that are involved with payment for any hospital and/or clinic charges incurred by the patient, any information that they request and deem necessary for payment/reimbursement, and/or quality review.  I further authorize the release of my health information to physicians or other health care practitioners on staff who are involved in my health care now and in the future, and to other health care providers, entities, or institutions for the purpose of my continued care and treatment, including referrals.     REGISTRATION AUTHORIZATION  Form No. 14686 (Rev. 3/25/2024)    Page 1 of 3                       Medicare Patient's Certification  and Authorization to Release Information and Payment Request:  I certify that the information given by me in applying for payment under Title XVIII of the Social Security Act is correct. I authorize any valdez of medical or other information about me to release to the Social SecurityAdministration, or its intermediaries or carriers, any information needed for this or a related Medicare claim. I request that payment of authorized benefits be made on my behalf.     Assignment of Insurance Benefits:   I hereby authorize any and all insurance companies, health plans, defined   benefit plans, health insurers or any entity that is or may be responsible for payment of my medical expenses to pay all hospital and medical benefits now due, and to become due and payable to me under any hospital benefits, sick benefits, injury benefits or any other benefit for services rendered to me, including Major Medical Benefits, direct to [facility] and all independently contracted physicians. I assign any and all rights that I may have against any and all insurance companies, health plans, defined benefit plans, health insurers or any entity that is or may be responsible for payment of my medical expenses, including, but not limited to any right to appeal a denial of a claim, any right to bring any action, lawsuit, administrative proceeding, or other cause of action on my behalf. I specifically assign my right to pursue litigation against any and all insurance companies, health plans, defined benefit plans, health insurers or any entity that is or may be responsible for payment of my medical expenses based upon a refusal to pay charges.            E. Valuables: It is understood and agreed that [facility] is not liable for the damage to or loss of any money, jewelry,   documents, dentures, eye glasses, hearing aids, prosthetics, or other property of value.     F. Computer Equipment: I understand and agree that should I choose to use computer  equipment owned by [facility] or if I choose to access the Internet via [facility]s network, I do so at my own risk. [facility] is not responsible for any damage to my computer equipment or to any damages of any type that might arise from my loss of equipment or data.     G. Acceptance of Financial Responsibility:  I agree that in consideration of the services and   supplies that have been   or will be furnished to the patient, I am hereby obligated to pay all charges made for or on the account of the patient according to the standard rates (in effect at the time the services and supplies are delivered) established by [facility], including its Patient Financial Assistance Policy to the extent it is applicable. I understand that I am responsible for all charges, or portions thereof, not covered by insurance or other sources. Patient refunds will be distributed only after balances at all [facility] facilities are paid.     H. Communication Authorization:  I hereby authorize [facility] and its representatives, along with any billing service   or  who may work on their behalf, to contact me on   my cell phone and/or home phone using pre- recorded messages, artificial voice messages, automatic telephone dialing devices or other computer assisted technology, or by electronic      mail, text messaging, or by any other form of electronic communication. This includes, but is not limited to, appointment reminders, yearly physical exam reminders, preventive care reminders, patient campaigns, welcome calls, and calls about account balances on my account or any account on which I am listed as a guarantor. I understand I have the right to opt out of these communications at any time.      Relationship  Between  Facility and  Provider:      I understand that some, but not all, providers furnishing services to the patient are not employees or agents of [facility]. The patient is under the care and supervision of his/her attending  physician, and it is the responsibility of the facility and its nursing staff to carry out the instructions of such physicians. It is the responsibility of the patient's physician/designee to obtain the patient's informed consent, when required, for medical or surgical treatment, special diagnostic or therapeutic procedures, or hospital services rendered for the patient under the special instructions of the physician/designee.           REGISTRATION AUTHORIZATION  Form No. 33981 (Rev. 3/25/2024)    Page 2 of 3                       Immunizations: [facility] shares immunization information with state sponsored health departments to help you and your doctor keep track of your immunization records. By signing, you consent to have this information shared with the health department in your state:                                [place] - LINKS ([place] Immunization Network for Kids Statewide)                                [place] - MIIX ([place] Immunization Information eXchange)                                [place] - ImmPRINT (Immunization Patient Registry with Integrated Technology)     TERM: This authorization is valid for this and subsequent care/treatment I receive at [facility] and will remain valid unless/until revoked in writing by me.     [facility]: As used in this document, [facility] means all [facility] owned and managed facilities, including, but not limited to, all health centers, surgery centers, clinics, urgent care centers, and hospitals.         [facility] complies with applicable Federal civil rights laws and does not discriminate on the basis of race, color, national origin, age, disability, or sex.  ATENCIÓN: si habla español, tiene a romo disposición servicios gratuitos de asistencia lingüística. Dorian chávez [phone].  JENNIFER Ý: N?u b?n nói Ti?ng Vi?t, có các d?ch v? h? tr? ngôn ng? mi?n phí dành cho b?n. G?i s? [phone].        REGISTRATION AUTHORIZATION  Form  No. 59609 (Rev. 3/25/2024)   Page 3 of 3

## 2025-06-10 NOTE — PROGRESS NOTES
Outside eye exam requested from eye surgery [facility] Dr Wellington  Pt saw PCP today 6/10/25  Overdue labs were ordered at visit    HM, care everywhere, immunizations updated  Chart review complete

## 2025-06-10 NOTE — PROGRESS NOTES
Assessment:     1. Controlled type 2 diabetes mellitus without complication, without long-term current use of insulin    2. Encounter for comprehensive diabetic foot examination, type 2 diabetes mellitus    3. Uncontrolled type 2 diabetes mellitus with hyperglycemia, without long-term current use of insulin      Plan:     Controlled type 2 diabetes mellitus without complication, without long-term current use of insulin  Stable 6.3%, was 6.4%,   continue MEtformin 500, 2 tabs in am & pm  & Farxiga 10 daily    after her dog bite, working at home & hard to motivate herself to walk.     See me after 12/10  for PHysical  prior at [facility]    Try to walk for a continuous 10 MINUTES EVERY DAY- in your house or outside (huffing & puffing burns calories & strengthens your heart).     Be aware of everything you eat. Read labels.   Try to keep < 5 g of sugar in ONE SERVING size    Try writing it down so you can see where sugars & carbohydrates are creeping into your foods (drinks other than water, salad dressing, snacks)    Remember, all white bread, white flour (used in baking)  white pasta, white rice, white potatoes, chips, crackers, cookies, sweets, sodas (even Gatorade, Powerade,Koolaid) , sugary coffee & tea,  desserts ----TURN INTO SUGAR.     Focus on moving more & cutting out  bread,  pasta, rice, chips (or reduce portion size in half)      Encounter for comprehensive diabetic foot examination, type 2 diabetes mellitus  No lesions, wear protective shoes all the time          HPI: Yamileth Wong is a 59 y.o. female with is here today for DM    Lab Results   Component Value Date    HGBA1C 6.3 (H) 06/04/2025    HGBA1C 6.4 (H) 10/30/2024    HGBA1C 6.1 (H) 04/11/2024     (H) 06/04/2025    MICALBCREAT 88.2 (H) 06/10/2025    CREATININE 0.54 06/04/2025    ESTGFRAFRICA 123 02/10/2022    EGFRNONAA 106 02/10/2022    LDLCALC 81 06/04/2025       Wt Readings from Last 5 Encounters:   06/10/25 85.2 kg (187 lb 13.3 oz)    04/29/25 86 kg (189 lb 9.5 oz)   04/30/24 82.2 kg (181 lb 3.5 oz)   11/20/23 77.1 kg (170 lb)   02/16/23 79.3 kg (174 lb 13.2 oz)       MEDS:  Diabetes Medications              dapagliflozin propanediol (FARXIGA) 10 mg tablet Take 1 tablet (10 mg total) by mouth once daily.    metFORMIN (GLUCOPHAGE) 500 MG tablet Take 2 tablets (1,000 mg total) by mouth 2 (two) times daily with meals.               History of Present Illness    CHIEF COMPLAINT:  Patient presents today for six-month diabetic follow-up    MEDICATIONS:  She reports current medications include cholesterol medication, Farxiga, Lexapro, Lisinopril, and metformin.    DIET AND EXERCISE:  She drinks water and sparkling water throughout the day, with morning coffee mixed with Premier protein and Stevia. She occasionally consumes diet Coke and diet cranberry juice with lime or lemon. For snacks, she eats [employer] sugar-free candy, limiting to two pieces at a time. She has been consuming fruits including watermelon and apples. Due to her work schedule from 7:00 AM to 7:00 PM, she reports eating late. Her exercise consists of using stairs daily at home, as she has stopped neighborhood walks after being bitten by a dog.    OPHTHALMOLOGIC:  Diabetic eye exam in 2024 was reported as normal.    FAMILY HISTORY:  Her brother has prostate cancer with stable blood test results at zero, approaching one year since diagnosis. He also has diabetes, currently managed with Mounjaro, with improved blood sugar decreasing from 10 to 5.7.      ROS:  ROS as indicated in HPI.             Current Outpatient Medications   Medication Instructions    atorvastatin (LIPITOR) 10 mg, Oral, Nightly    blood sugar diagnostic (BLOOD GLUCOSE TEST) Strp 1 strip, Misc.(Non-Drug; Combo Route), 2 times daily before meals    blood-glucose meter kit 1 each, Other, Daily, Use as instructed    dapagliflozin propanediol (FARXIGA) 10 mg, Oral, Daily, Put refills on file    EScitalopram oxalate  "(LEXAPRO) 20 mg, Oral, Daily    lancets (ACTI-AROLDO LANCETS) 28 gauge Misc 1 lancet , Subcutaneous, 2 times daily before meals    lancets Misc Test bid    lisinopriL 10 mg, Oral, Daily    metFORMIN (GLUCOPHAGE) 1,000 mg, Oral, 2 times daily with meals, Refills on file       Lab Results   Component Value Date    HGBA1C 6.3 (H) 06/04/2025    HGBA1C 6.4 (H) 10/30/2024    HGBA1C 6.1 (H) 04/11/2024     Lab Results   Component Value Date    MICALBCREAT 88.2 (H) 06/10/2025     Lab Results   Component Value Date    LDLCALC 81 06/04/2025    LDLCALC 67 04/11/2024    CHOL 175 06/04/2025    HDL 76 06/04/2025    TRIG 95 06/04/2025       Lab Results   Component Value Date     06/04/2025    K 5.1 06/04/2025     06/04/2025    CO2 22 06/04/2025     (H) 06/04/2025    BUN 20 06/04/2025    CREATININE 0.54 06/04/2025    CALCIUM 9.3 06/04/2025    PROT 7.2 06/04/2025    ALBUMIN 4.7 06/04/2025    BILITOT 0.4 06/04/2025    ALKPHOS 50 (L) 11/10/2016    AST 15 06/04/2025    ALT 15 06/04/2025    ANIONGAP 9 11/10/2016    ESTGFRAFRICA 123 02/10/2022    EGFRNONAA 106 02/10/2022    WBC 6.6 06/04/2025    HGB 14.5 06/04/2025    HGB 13.3 04/11/2024    HCT 45.1 (H) 06/04/2025    MCV 93.0 06/04/2025     06/04/2025    TSH 1.66 08/10/2022    HEPCAB Negative 08/02/2016       No results found for: "LH", "FSH", "TOTALTESTOST", "PROGESTERONE", "ESTRADIOL", "PZFCSKGM01SE", "[id number]", "FERRITIN", "IRON", "TRANSFERRIN", "TIBC", "FESATURATED", "ZINC"      Past Medical History:   Diagnosis Date    Abnormal CT scan, chest 03/14/2017    At [facility], subcarinal 3 cm appears to be left lobe thyroid MRI July 2017 [facility], Dr Haynes    Abnormal Pap smear of cervix     Anxiety 10/9/2013    Lexapro works well    Colon polyp     2016, repeat before 2021    Hot thyroid nodule 2009    Dr Haynes    Hyperlipidemia associated with type 2 diabetes mellitus 10/9/2013    Hypertension associated with diabetes 10/9/2013    Menopause     Seasonal " "allergies 2/16/2023    Trigger finger of left thumb 2/16/2023    Uncontrolled type 2 diabetes mellitus with hyperglycemia, without long-term current use of insulin 03/14/2017    [facility]    Vaginal Pap smear 02/25/2013    Pap/Hpv negative  ([place] Garrison[place] [facility] [place]      Past Surgical History:   Procedure Laterality Date    HYSTERECTOMY  2007    BARTOLOME/BSO for fibroids    NECK LESION BIOPSY  2017    Neck needle Bx -- Thyroid Nodule     THYROIDECTOMY, PARTIAL  2010    [facility]     Vitals:    06/10/25 1048   BP: 120/60   Pulse: 86   Temp: 98.6 °F (37 °C)   TempSrc: Oral   SpO2: 98%   Weight: 85.2 kg (187 lb 13.3 oz)   Height: 5' 2" (1.575 m)   PainSc: 0-No pain     Wt Readings from Last 5 Encounters:   06/10/25 85.2 kg (187 lb 13.3 oz)   04/29/25 86 kg (189 lb 9.5 oz)   04/30/24 82.2 kg (181 lb 3.5 oz)   11/20/23 77.1 kg (170 lb)   02/16/23 79.3 kg (174 lb 13.2 oz)     Objective:   Physical Exam  Constitutional:       Appearance: She is well-developed.   HENT:      Head: Normocephalic and atraumatic.      Right Ear: External ear normal.      Left Ear: External ear normal.      Nose: Nose normal.   Eyes:      Pupils: Pupils are equal, round, and reactive to light.   Neck:      Thyroid: No thyromegaly.   Cardiovascular:      Rate and Rhythm: Normal rate and regular rhythm.      Heart sounds: Normal heart sounds. No murmur heard.  Pulmonary:      Effort: Pulmonary effort is normal.      Breath sounds: Normal breath sounds. No wheezing.   Abdominal:      General: Bowel sounds are normal. There is no distension.      Palpations: Abdomen is soft. There is no mass.      Tenderness: There is no abdominal tenderness. There is no guarding or rebound.   Musculoskeletal:      Cervical back: Neck supple.      Right foot: Normal range of motion. No deformity.      Left foot: Normal range of motion. No deformity.      Comments:      Feet:      Right foot:      Protective Sensation: 5 sites tested.  5 sites sensed.      Skin " integrity: No ulcer, blister, skin breakdown, erythema or warmth.      Left foot:      Protective Sensation: 5 sites tested.  5 sites sensed.      Skin integrity: No ulcer, blister, skin breakdown, erythema or warmth.   Lymphadenopathy:      Cervical: No cervical adenopathy.   Skin:     General: Skin is warm and dry.   Neurological:      Mental Status: She is alert and oriented to person, place, and time.   Psychiatric:         Behavior: Behavior normal.

## 2025-06-10 NOTE — ASSESSMENT & PLAN NOTE
Stable 6.3%, was 6.4%,   continue MEtformin 500, 2 tabs in am & pm  & Farxiga 10 daily    after her dog bite, working at home & hard to motivate herself to walk.     See me after 12/10  for PHysical  prior at [facility]    Try to walk for a continuous 10 MINUTES EVERY DAY- in your house or outside (huffing & puffing burns calories & strengthens your heart).     Be aware of everything you eat. Read labels.   Try to keep < 5 g of sugar in ONE SERVING size    Try writing it down so you can see where sugars & carbohydrates are creeping into your foods (drinks other than water, salad dressing, snacks)    Remember, all white bread, white flour (used in baking)  white pasta, white rice, white potatoes, chips, crackers, cookies, sweets, sodas (even Gatorade, Powerade,Koolaid) , sugary coffee & tea,  desserts ----TURN INTO SUGAR.     Focus on moving more & cutting out  bread,  pasta, rice, chips (or reduce portion size in half)

## 2025-06-11 RX ORDER — LISINOPRIL 10 MG/1
10 TABLET ORAL DAILY
Qty: 90 TABLET | Refills: 2 | Status: SHIPPED | OUTPATIENT
Start: 2025-06-11

## 2025-08-12 RX ORDER — DAPAGLIFLOZIN 10 MG/1
10 TABLET, FILM COATED ORAL DAILY
Qty: 90 TABLET | Refills: 1 | Status: SHIPPED | OUTPATIENT
Start: 2025-08-12

## 2025-08-29 ENCOUNTER — PATIENT MESSAGE (OUTPATIENT)
Dept: ADMINISTRATIVE | Facility: HOSPITAL | Age: 60
End: 2025-08-29
Payer: COMMERCIAL